# Patient Record
Sex: FEMALE | Race: BLACK OR AFRICAN AMERICAN | Employment: FULL TIME | URBAN - METROPOLITAN AREA
[De-identification: names, ages, dates, MRNs, and addresses within clinical notes are randomized per-mention and may not be internally consistent; named-entity substitution may affect disease eponyms.]

---

## 2018-10-23 ENCOUNTER — HOSPITAL ENCOUNTER (OUTPATIENT)
Dept: PHYSICAL THERAPY | Age: 43
Discharge: HOME | End: 2018-10-23
Payer: COMMERCIAL

## 2018-10-23 PROCEDURE — 97110 THERAPEUTIC EXERCISES: CPT

## 2018-10-23 PROCEDURE — 97530 THERAPEUTIC ACTIVITIES: CPT

## 2018-10-23 PROCEDURE — 97162 PT EVAL MOD COMPLEX 30 MIN: CPT

## 2018-10-23 PROCEDURE — 97112 NEUROMUSCULAR REEDUCATION: CPT

## 2018-10-23 NOTE — PROGRESS NOTES
[facility] 
[address] [place], [address] [facility] [address] [phone]  Fx [phone] Plan of Care/ Statement of Necessity for Physical Therapy Services Patient name: Gillian Michel Start of Care: 10/23/2018 Referral source: Edelmira Ly,* : 1975 Medical Diagnosis: Right shoulder pain [M25.511] Onset Date:beginning Oct 2018 Treatment Diagnosis: postural deficit associated with right>left upper quadrant pain Prior Hospitalization: see medical history Provider#: [id number] Medications: Verified on Patient summary List  
 Comorbidities: hs of CS/LS pain, Lupus Prior Level of Function: full function without shoulder but occasional neck pain The Plan of Care and following information is based on the information from the initial evaluation. Assessment/ key information: 37 YOF presenting to PT with reports of right >left shoulder/pectoral pain ranging from 3-10/10 and intermittent paresthesia in right arm. Objective findings include slumped habitual sitting posture, full shoulder /CS ROM except for right shoulder horizontal ABD, deficits in flexibility of shoulder girdle musculature ema anterior chest, TTP shoulder girdle musculature, (-) Empty can test, 5/5 MMT both shoulders with pain during resisted shoulder IR>ER. Patient does report bilateral intermittent popping of shoulders with backwards reaching. She also presents with shallow breathing pattern contributing to neck shoulder tightness. Symptoms are consistent with postural induced muscle dysfunction . She is a good candidate for skilled PT.   
Evaluation Complexity History MEDIUM  Complexity : 1-2 comorbidities / personal factors will impact the outcome/ POC ; Examination MEDIUM Complexity : 3 Standardized tests and measures addressing body structure, function, activity limitation and / or participation in recreation  ;Presentation MEDIUM Complexity : Evolving with changing characteristics ;Clinical Decision Making MEDIUM Complexity : FOTO score of 26-74 Overall Complexity Rating: MEDIUM Problem List: pain affecting function and decrease flexibility/ joint mobility Treatment Plan may include any combination of the following: Therapeutic exercise, Therapeutic activities, Neuromuscular re-education, Physical agent/modality, Manual therapy and Patient education Patient / Family readiness to learn indicated by: trying to perform skills and interest 
Persons(s) to be included in education: patient (P) Barriers to Learning/Limitations: None Patient Goal (s): alleviation of pain Patient Self Reported Health Status: good Rehabilitation Potential: good Short Term Goals: To be accomplished in 2 weeks: 1. Improved postural awareness and breathing pattern to allow self correction during ADL to facilitate optimal function/muscle status Status at [facility]: Patient education initiated 2. Patient reports reduction in pain with ADL to <or= 5/10 for increased ease with ADL Status at [facility]: pain 3-10/10 3. Reduction in right arm paresthesia by >or= 50% for increased ease with all ADL Status at [facility]: intermittent paresthesia right arm including digits 1-2 Long Term Goals: To be accomplished in 4 weeks: 1. Improved FOTO score to >or=72/100 as evidence of improved ability to perform overhead reaching and reaching across body Status at [facility]: FOTO 53/100 2. Reduction in pain to >or= 3/10 with prolonged sitting, computer work and ADL Status at [facility]: pain 3-10/10 3. Improved chest flexibility ema pectoralis muscle group to allow supine position with > 90 deg horizontal ABD for improved ease with ADL/reaching Status at [facility]: marked tightness right >left pectoralis/anterior chest 
 
4. Improved postural strength to allow good alignment during prolonged computer work for optimal muscle balance Status at [facility]: habitual fair kyphotic posture with [facility] 5. Patient independent with advanced HEP and symptom management to prepare for d/c to HEP Status at Eval: HEP initiated Frequency / Duration: Patient to be seen 2 times per week for 4 weeks. Patient/ Caregiver education and instruction: Diagnosis, prognosis, activity modification and exercises 
 [x]  Plan of care has been reviewed with PTA Walter Barlow, PT 10/23/2018 11:11 AM 
 
________________________________________________________________________ I certify that the above Therapy Services are being furnished while the patient is under my care. I agree with the treatment plan and certify that this therapy is necessary. [de-identified] Signature:____________Date:_________TIME:________ 
 
[employer], Date and Time must be completed for valid certification ** Please sign and return to [facility] 
[address], [address] [facility] [address] [phone]  Fx [phone]

## 2018-10-23 NOTE — PROGRESS NOTES
PT DAILY TREATMENT NOTE/SHOULDER EVAL 10-18 Patient Name: Kacie Denson Date:10/23/2018 : 1975 [x]  Patient  Verified Payor: Claude Older / Plan: [employer] RPN / Product Type: Commerical / In time:1110  Out time:1215 Total Treatment Time (min): 65 Visit #: 1 of 8 Medicare/BCBS Only Total Timed Codes (min): 45  1:1 Treatment Time:  55 Treatment Area: Right shoulder pain [M25.511] SUBJECTIVE Pain Level (0-10 scale): 6-7 right shoulder, 2-3/10 left shoulder 
[]constant [x]intermittent []improving []worsening []no change since onset Any medication changes, allergies to medications, adverse drug reactions, diagnosis change, or new procedure performed?: [x] No    [] Yes (see summary sheet for update) Subjective functional status/changes:reports waking up one morning about 2 weeks ago with difficulty moving the right shoulder. Pain started radiating into right neck shoulder region and also towards left shoulder. Feeling like need to support right arm to reduce pressure on shoulder. Nagging pain is constant. voltaren has been helping to ease the pain. Most pain upon waking in AM. Stiffness and achy pain in AM. 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 Reports also having Lupus with active flare-ups 2-3x each month including hand edema. No testing done so far. [facility] 10/2017- PT for CS and LS pain. PLOF: no shoulder pain but previous hx of right neck pain, full function Limitations to PLOF: full function with shoulder pain Mechanism of Injury: n/a Current symptoms/Complaints: mostly right shoulder pain, occasional paresthesia throughout right arm and hand/digits 1-2? Eloy Counter This mostly at work when arm is hanging. Previous Treatment/Compliance: PT for CS/LS in 2017 PMHx/Surgical Hx: n/a Work Hx: [employer] Living Situation: WNL Pt Goals: alleviate pain in right neck shoulder region Barriers: []pain []financial []time []transportation []other Motivation: good Substance use: []Alcohol []Tobacco []other:  
FABQ Score: []low []elevate Cognition: A & O x 3    Other: OBJECTIVE/EXAMINATION Domestic Life: WNL Activity/Recreational Limitations: n/a Mobility: WNL Self Care: WNL Modality rationale: Pain control Type Additional Details  
[] Estim:  []Unatt       []IFC  []Premod []Other:  []w/ice   []w/heat Position: Location:  
[] Estim: []Att    []TENS instruct  []NMES []Other:  []w/US   []w/ice   []w/heat Position: Location:  
[]  Traction: [] Cervical       []Lumbar 
                     [] Prone          []Supine []Intermittent   []Continuous Lbs: 
[] before manual 
[] after manual  
[]  Ultrasound: []Continuous   [] Pulsed []1MHz   []3MHz Location: 
W/cm2:  
[]  Iontophoresis with dexamethasone Location: [] Take home patch  
[] In clinic  
[]  Ice     [x]  Heat 10 min 
[]  Ice massage 
[]  Laser  
[]  Anodyne Position:supine 90/90 Location:CS and anterior chest  
[]  Laser with stim 
[]  Other: Position: Location:  
[]  Vasopneumatic Device Pressure:       [] lo [] med [] hi  
Temperature: [] lo [] med [] hi  
[] Skin assessment post-treatment:  []intact []redness- no adverse reaction 
  []redness  adverse reaction:  
 
15 min [x]Eval                  []Re-Eval  
 
 
10 min Therapeutic Exercise:  [x] See flow sheet :ROM both shoulders Rationale: increase ROM to improve the patients ability to return to PLOF without pain 15 min Therapeutic Activity:  [x]  See flow sheet :instruction in HEP, POC, breathing ex for improved rib cage mobility and diaphragmatic function, functional massage and MFR to right pec region Rationale: optimal trunk function and breathing pattern 15 min Neuromuscular Re-education:  [x]  See flow sheet :LISA for improved breathing, rib cage function and muscular balance Rationale: improved trunk function, postural alignment and muscle balance With 
 [] TE 
 [] TA 
 [] neuro 
 [] other: Patient Education: [x] Review HEP [] Progressed/Changed HEP based on:  
[] positioning   [] body mechanics   [] transfers   [] heat/ice application   
[] other:   
 
Other Objective/Functional Measures: as per evaluation Physical Therapy Evaluation - Shoulder Posture: [] Poor    [x] Fair    [] Good    Describe: 
 
ROM:  [] Unable to assess at this time AROM                                                              PROM Left Right  Left Right Flexion WNL WNL Flexion Extension WNL WNL Extension Scaption/ABD WNL WNL Scaptin/ABD    
ER @ 0 Degrees   ER @ 0 Degrees ER @ 90 Degrees   ER @ 90 Degrees IR @ 90 Degrees WNL WNL IR @ 90 Degrees HGIR right 30, , HGIR left 30,  Horizontal ADD WNL but onset of pain in pec region, both Popping in both shoulders with seated reach to LS End Feel / Painful Arc:(-) 
CS Rotation causing stretching/discomfort in pec reaching CS ROM WNL Strength:   [] Unable to assess at this time L (1-5) R (1-5) Pain Flexors 5 5 [] Yes   [x] No  
Abductors 5 5 [x] Yes   [] No  
External Rotators 5 5 [x] Yes   [] No  
Internal Rotators 5 5 [x] Yes   [] No  
Supraspinatus 5 5- [] Yes   [x] No  
Serratus Anterior 5 5 [] Yes   [] No  
Lower Trapezius 5 5 [] Yes   [] No  
Elbow Flexion 5 5 [] Yes   [] No  
Elbow Extension 5 5 [] Yes   [] No  
 
 
Scapulohumoral Control / Rhythm: 
Able to eccentrically lower with good control? Left: [x] Yes   [] No     Right: [x] Yes   [] No 
 
Accessory Motions: 
 
Palpation [] Min  [x] Mod  [] Severe    Location:overall right>left upper quadrant soreness ema UT, rhomboids, infraspinatus,levator, pec min/yuan, infraclavic right 
[] Min  [] Mod  [] Severe    Location: 
[] Min  [] Mod  [] Severe    Location: 
 
 
 
Optional Tests: (-) Sensation Left Right Reflexes Left Right Biceps (C5)   Biceps (C5) Clay Radial(C6-7)   Brachioradialis (C6) Clay Ulnar(C8-T1)   Triceps (C7) Adson's Test  [] Pos   [] Neg Yergason's Test [] Pos   [] Neg 
Marisel's Test  [] Pos   [] Neg Kenzie's Sign [] Pos   [] Neg Neer's Test  [] Pos   [] Neg Clunk Test  [] Pos   [] Neg 
Hawkin's Test  [] Pos   [] Neg AC Joint  [] Pos   [] Neg 
Speed's Test  [] Pos   [] Neg SC Joint  [] Pos   [] Neg 
Empty Can  [] Pos   [x] Neg Pectoral Tightness [x] Pos   [] Neg Anterior Apprehension [] Pos   [] Neg  
Posterior Apprehension [] Pos   [] Neg Other Tests / Comments:  
  
 
Pain Level (0-10 scale) post treatment: 0 
 
ASSESSMENT/Changes in Function: good tolerance to activities, less shoulder girdle guarding Patient will continue to benefit from skilled PT services to address ROM deficits and assess and modify postural abnormalities to attain remaining goals. [x]  See Plan of Care 
[]  See progress note/recertification 
[]  See Discharge Summary Progress towards goals / Updated goals: 
Reduction in pain, improved flexibility PLAN 
[]  Upgrade activities as tolerated     [x]  Continue plan of care 
[]  Update interventions per flow sheet      
[]  Discharge due to:_ 
[]  Other:_ Mina Bess PT 10/23/2018  11:11 AM

## 2018-11-01 ENCOUNTER — HOSPITAL ENCOUNTER (OUTPATIENT)
Dept: PHYSICAL THERAPY | Age: 43
Discharge: HOME | End: 2018-11-01
Payer: COMMERCIAL

## 2018-11-01 PROCEDURE — 97110 THERAPEUTIC EXERCISES: CPT

## 2018-11-01 PROCEDURE — 97140 MANUAL THERAPY 1/> REGIONS: CPT

## 2018-11-01 NOTE — PROGRESS NOTES
PT DAILY TREATMENT NOTE 12 Patient Name: Gillian Michel Date:2018 : 1975 [x]  Patient  Verified Payor: Tova Lin / Plan: Pran[employer] RPN / Product Type: Commerical / In time:1258  Out time:150 Total Treatment Time (min): 52 Visit #: 2 of 8 Treatment Area: No admission diagnoses are documented for this encounter. SUBJECTIVE Pain Level (0-10 scale): 3-4 right anterior shoulder Any medication changes, allergies to medications, adverse drug reactions, diagnosis change, or new procedure performed?: [x] No    [] Yes (see summary sheet for update) Subjective functional status/changes:   [] No changes reported Less pain in right chest, min pain right anterior shoulder OBJECTIVE Modality rationale: Pain control Type Additional Details  
[] Estim:  []Unatt       []IFC  []Premod []Other:  []w/ice   []w/heat Position: Location:  
[] Estim: []Att    []TENS instruct  []NMES []Other:  []w/US   []w/ice   []w/heat Position: Location:  
[]  Traction: [] Cervical       []Lumbar 
                     [] Prone          []Supine []Intermittent   []Continuous Lbs: 
[] before manual 
[] after manual  
[]  Ultrasound: []Continuous   [] Pulsed []1MHz   []3MHz W/cm2: 
Location:  
[]  Iontophoresis with dexamethasone Location: [] Take home patch  
[] In clinic  
[]  Ice     [x]  Heat 10 min 
[]  Ice massage 
[]  Laser  
[]  Anodyne Position:supine 90/90 Location:right neck shoulder  
[]  Laser with stim 
[]  Other:  Position: Location:  
[]  Vasopneumatic Device Pressure:       [] lo [] med [] hi  
Temperature: [] lo [] med [] hi  
[] Skin assessment post-treatment:  []intact []redness- no adverse reaction 
  []redness  adverse reaction:  
 
 
 
25 min Therapeutic Exercise:  [] See flow sheet :  
Rationale: increase ROM and increase strength to improve the patients ability to return to PLOF 12 min Neuromuscular Reeducation: LISA for improved trunk /shoulder alignment 15 min Manual Therapy:  Functional massage right shoulder, CFM to biceps tendon and supraspinatus insertion, facilitation of proper breathing and rib cage mobiliZation Rationale: decrease pain, increase ROM and increase tissue extensibility to improve function With 
 [] TE 
 [] TA 
 [] neuro 
 [] other: Patient Education: [x] Review HEP [] Progressed/Changed HEP based on:  
[] positioning   [] body mechanics   [] transfers   [] heat/ice application   
[] other:   
 
Other Objective/Functional Measures:  
TTP right pec minor, supraspinatus insertion, Right AC stiffness Challenged with QP activities Pain Level (0-10 scale) post treatment: 0 
 
ASSESSMENT/Changes in Function: no pain with active Flex post MT right shoulder Patient will continue to benefit from skilled PT services to address ROM deficits, address strength deficits and analyze and address soft tissue restrictions to attain remaining goals. [x]  See Plan of Care 
[]  See progress note/recertification 
[]  See Discharge Summary Progress towards goals / Updated goals: 
Short Term Goals: To be accomplished in 2 weeks: 1. Improved postural awareness and breathing pattern to allow self correction during ADL to facilitate optimal function/muscle status Status at Eval: Patient education initiated Current status: compliant with HEP 
  
2. Patient reports reduction in pain with ADL to <or= 5/10 for increased ease with ADL Status at Eval: pain 3-10/10 
  
3. Reduction in right arm paresthesia by >or= 50% for increased ease with all ADL Status at Eval: intermittent paresthesia right arm including digits 1-2 
  
Long Term Goals: To be accomplished in 4 weeks: 1. Improved FOTO score to >or=72/100 as evidence of improved ability to perform overhead reaching and reaching across body Status at Eval: FOTO 53/100   
2. Reduction in pain to >or= 3/10 with prolonged sitting, computer work and ADL Status at Eval: pain 3-10/10 
  
3. Improved chest flexibility ema pectoralis muscle group to allow supine position with > 90 deg horizontal ABD for improved ease with ADL/reaching Status at Eval: marked tightness right >left pectoralis/anterior chest 
  
4. Improved postural strength to allow good alignment during prolonged computer work for optimal muscle balance Status at Eval: habitual fair kyphotic posture with FHP 
  
5. Patient independent with advanced HEP and symptom management to prepare for d/c to HEP Status at Eval: HEP initiated 
  
 
 
 
PLAN 
[]  Upgrade activities as tolerated     []  Continue plan of care 
[]  Update interventions per flow sheet      
[]  Discharge due to:_ 
[]  Other:_ Gema Bone PT 11/1/2018  1:43 PM 
 
Future Appointments Date Time Provider Larry Barry 11/2/2018 11:00 AM Dena Hathaway PT [facility]  
11/8/2018  2:00 PM Dena Hathaway PT [facility]  
11/9/2018  1:00 PM THE [facility] PT RES [facility]  
11/15/2018  1:00 PM Nikolai Westfall PT [facility]  
11/16/2018  1:00 PM THE [facility] PT RES [facility]

## 2018-11-02 ENCOUNTER — APPOINTMENT (OUTPATIENT)
Dept: PHYSICAL THERAPY | Age: 43
End: 2018-11-02
Payer: COMMERCIAL

## 2018-11-08 ENCOUNTER — HOSPITAL ENCOUNTER (OUTPATIENT)
Dept: PHYSICAL THERAPY | Age: 43
Discharge: HOME | End: 2018-11-08
Payer: COMMERCIAL

## 2018-11-08 PROCEDURE — 97140 MANUAL THERAPY 1/> REGIONS: CPT

## 2018-11-08 PROCEDURE — 97110 THERAPEUTIC EXERCISES: CPT

## 2018-11-08 PROCEDURE — 97112 NEUROMUSCULAR REEDUCATION: CPT

## 2018-11-08 NOTE — PROGRESS NOTES
PT DAILY TREATMENT NOTE  Patient Name: Kacie Denson Date:2018 : 1975 [x]  Patient  Verified Payor: Claude Older / Plan: Malachi Odell RPN / Product Type: Commerical / In time:205  Out time:250 Total Treatment Time (min): 45 Visit #: 3 of 8 Treatment Area: Pain in right shoulder [M25.511] SUBJECTIVE Pain Level (0-10 scale): 0 right anterior shoulder Any medication changes, allergies to medications, adverse drug reactions, diagnosis change, or new procedure performed?: [x] No    [] Yes (see summary sheet for update) Subjective functional status/changes:   [] No changes reported Improved pain, no more right arm paresthesia, no nocturnal pain, residual right chest tigntness OBJECTIVE Modality rationale: Pain control Type Additional Details  
[] Estim:  []Unatt       []IFC  []Premod []Other:  []w/ice   []w/heat Position: Location:  
[] Estim: []Att    []TENS instruct  []NMES []Other:  []w/US   []w/ice   []w/heat Position: Location:  
[]  Traction: [] Cervical       []Lumbar 
                     [] Prone          []Supine []Intermittent   []Continuous Lbs: 
[] before manual 
[] after manual  
[]  Ultrasound: []Continuous   [] Pulsed []1MHz   []3MHz W/cm2: 
Location:  
[]  Iontophoresis with dexamethasone Location: [] Take home patch  
[] In clinic  
[]  Ice     [x]  Heat 10 min 
[]  Ice massage 
[]  Laser  
[]  Anodyne Position:supine 90/90 Location:right neck shoulder  
[]  Laser with stim 
[]  Other:  Position: Location:  
[]  Vasopneumatic Device Pressure:       [] lo [] med [] hi  
Temperature: [] lo [] med [] hi  
[] Skin assessment post-treatment:  []intact []redness- no adverse reaction 
  []redness  adverse reaction:  
 
 
 
15 min Therapeutic Exercise:  [] See flow sheet :  
Rationale: increase ROM and increase strength to improve the patients ability to return to [facility] 10 min Neuromuscular Reeducation: core activation and shoulder stability 10 min Manual Therapy:  Functional massage right shoulder, CFM to biceps tendon and supraspinatus insertion, Rationale: decrease pain, increase ROM and increase tissue extensibility to improve function With 
 [] TE 
 [] TA 
 [] neuro 
 [] other: Patient Education: [x] Review HEP [] Progressed/Changed HEP based on:  
[] positioning   [] body mechanics   [] transfers   [] heat/ice application   
[] other:   
 
Other Objective/Functional Measures:  
TTP right pec minor, supraspinatus insertion, Right AC stiffness Challenged with QP activities Pain Level (0-10 scale) post treatment: 0 
 
ASSESSMENT/Changes in Function: no pain with active Flex post MT right shoulder Patient will continue to benefit from skilled PT services to address ROM deficits, address strength deficits and analyze and address soft tissue restrictions to attain remaining goals. [x]  See Plan of Care 
[]  See progress note/recertification 
[]  See Discharge Summary Progress towards goals / Updated goals: 
Short Term Goals: To be accomplished in 2 weeks: 1. Improved postural awareness and breathing pattern to allow self correction during ADL to facilitate optimal function/muscle status Status at Eval: Patient education initiated Current status: compliant with HEP 
  
2. Patient reports reduction in pain with ADL to <or= 5/10 for increased ease with ADL Status at Eval: pain 3-10/10 
  
3. Reduction in right arm paresthesia by >or= 50% for increased ease with all ADL Status at Eval: intermittent paresthesia right arm including digits 1-2 
  
Long Term Goals: To be accomplished in 4 weeks: 1. Improved FOTO score to >or=72/100 as evidence of improved ability to perform overhead reaching and reaching across body Status at Eval: FOTO 53/100 
  
 2. Reduction in pain to >or= 3/10 with prolonged sitting, computer work and ADL Status at Eval: pain 3-10/10 
  
3. Improved chest flexibility ema pectoralis muscle group to allow supine position with > 90 deg horizontal ABD for improved ease with ADL/reaching Status at Eval: marked tightness right >left pectoralis/anterior chest 
  
4. Improved postural strength to allow good alignment during prolonged computer work for optimal muscle balance Status at Eval: habitual fair kyphotic posture with FHP 
  
5. Patient independent with advanced HEP and symptom management to prepare for d/c to HEP Status at Eval: HEP initiated 
  
 
 
 
PLAN 
[]  Upgrade activities as tolerated     []  Continue plan of care 
[]  Update interventions per flow sheet      
[]  Discharge due to:_ 
[]  Other:_ Viridiana Wade, PT 11/8/2018  1:43 PM 
 
Future Appointments Date Time Provider Larry Barry 11/9/2018  1:00 PM THE [facility] PT RES [facility]  
11/15/2018  1:00 PM Fabienne Mendoza, PT [facility]  
11/16/2018  1:00 PM THE [facility] PT RES [facility]

## 2018-11-09 ENCOUNTER — HOSPITAL ENCOUNTER (OUTPATIENT)
Dept: PHYSICAL THERAPY | Age: 43
Discharge: HOME | End: 2018-11-09
Payer: COMMERCIAL

## 2018-11-09 PROCEDURE — 97140 MANUAL THERAPY 1/> REGIONS: CPT

## 2018-11-09 PROCEDURE — 97110 THERAPEUTIC EXERCISES: CPT

## 2018-11-09 NOTE — PROGRESS NOTES
PT DAILY TREATMENT NOTE  Patient Name: Dana Mcgee Date:2018 : 1975 [x]  Patient  Verified Payor: Ellis Kim / Plan: Marjan Rebolledo RPN / Product Type: Commerical / In time:1300  Out time:1330 Total Treatment Time (min): 30 min Visit #: 4 of 8 Treatment Area: Pain in right shoulder [M25.511] SUBJECTIVE Pain Level (0-10 scale): 0 right anterior shoulder Any medication changes, allergies to medications, adverse drug reactions, diagnosis change, or new procedure performed?: [x] No    [] Yes (see summary sheet for update) Subjective functional status/changes:   [] No changes reported C/o increased pain today; was just in PT last night and feels increased soreness. Agreeable to participate. OBJECTIVE 20 min Therapeutic Exercise:  [x] See flow sheet :  
Rationale: increase ROM and increase strength to improve the patients ability to return to PLOF 10 min Manual Therapy:  Passive posterior capsule stretch and pectoralis stretch; posterior and inferior glenohumeral mob, grade II. Rationale: decrease pain, increase ROM and increase tissue extensibility to improve function With 
 [x] TE 
 [] TA 
 [] neuro 
 [] other: Patient Education: [x] Review HEP [] Progressed/Changed HEP based on:  
[] positioning   [] body mechanics   [] transfers   [x] heat/ice application   
[] other:   
 
 
 
Pain Level (0-10 scale) post treatment: 5 
 
ASSESSMENT/Changes in Function: Modified program today to avoid further exacerbation. Tolerated modification well. Patient will continue to benefit from skilled PT services to address ROM deficits, address strength deficits and analyze and address soft tissue restrictions to attain remaining goals. [x]  See Plan of Care 
[]  See progress note/recertification 
[]  See Discharge Summary Progress towards goals / Updated goals: 
Short Term Goals: To be accomplished in 2 weeks: 1. Improved postural awareness and breathing pattern to allow self correction during ADL to facilitate optimal function/muscle status Status at Eval: Patient education initiated Current status: compliant with HEP 
  
2. Patient reports reduction in pain with ADL to <or= 5/10 for increased ease with ADL Status at Eval: pain 3-10/10 
  
3. Reduction in right arm paresthesia by >or= 50% for increased ease with all ADL Status at Eval: intermittent paresthesia right arm including digits 1-2 
  
Long Term Goals: To be accomplished in 4 weeks: 1. Improved FOTO score to >or=72/100 as evidence of improved ability to perform overhead reaching and reaching across body Status at Eval: FOTO 53/100 
  
2. Reduction in pain to >or= 3/10 with prolonged sitting, computer work and ADL Status at Eval: pain 3-10/10 
  
3. Improved chest flexibility ema pectoralis muscle group to allow supine position with > 90 deg horizontal ABD for improved ease with ADL/reaching Status at Eval: marked tightness right >left pectoralis/anterior chest 
  
4. Improved postural strength to allow good alignment during prolonged computer work for optimal muscle balance Status at Eval: habitual fair kyphotic posture with FHP 
  
5. Patient independent with advanced HEP and symptom management to prepare for d/c to HEP Status at Eval: HEP initiated 
  
 
 
 
PLAN 
[]  Upgrade activities as tolerated     [x]  Continue plan of care 
[]  Update interventions per flow sheet      
[]  Discharge due to:_ 
[]  Other:_   
 
Wagner , PT, DPT, OCS  11/9/2018  1:43 PM 
 
Future Appointments Date Time Provider Larry Barry 11/15/2018  1:00 PM Miesha Lord PT [facility]  
11/16/2018  1:00 PM [facility] PT RES CTR [facility]

## 2018-11-15 ENCOUNTER — HOSPITAL ENCOUNTER (OUTPATIENT)
Dept: PHYSICAL THERAPY | Age: 43
Discharge: HOME | End: 2018-11-15
Payer: COMMERCIAL

## 2018-11-15 PROCEDURE — 97140 MANUAL THERAPY 1/> REGIONS: CPT | Performed by: PHYSICAL THERAPIST

## 2018-11-15 PROCEDURE — 97110 THERAPEUTIC EXERCISES: CPT | Performed by: PHYSICAL THERAPIST

## 2018-11-15 NOTE — PROGRESS NOTES
PT DAILY TREATMENT NOTE  Patient Name: Janine Strange Date:11/15/2018 : 1975 [x]  Patient  Verified Payor: Laly Diaz / Plan: Gerri Briceño RPN / Product Type: Commerical / In time:1310  Out time:1405 Total Treatment Time (min): 55 Visit #: 5 of 8 Treatment Area: Pain in right shoulder [M25.511] SUBJECTIVE Pain Level (0-10 scale): 0 Any medication changes, allergies to medications, adverse drug reactions, diagnosis change, or new procedure performed?: [x] No    [] Yes (see summary sheet for update) Subjective functional status/changes:   [x] No changes reported OBJECTIVE 
  
  
43 min Therapeutic Exercise:  [x] See flow sheet :  
Rationale: increase ROM and increase strength to improve the patients ability to return to PLOF 
  
  
12 min Manual Therapy:  manipulation to thoracic , mm release for levator scap, and pec minor Rationale: decrease pain, increase ROM and increase tissue extensibility to improve function With 
 [] TE 
 [] TA 
 [] neuro 
 [] other: Patient Education: [x] Review HEP [] Progressed/Changed HEP based on:  
[] positioning   [] body mechanics   [] transfers   [] heat/ice application   
[] other:   
 
Other Objective/Functional Measures: left rotation alignment T1 - T4 , noted in sitting with stiffness at neck end range rotation ( motion itself is WNL) ,  
Manual tech manipulation to 3 levels thoracic C7/T1 junction , T4 and T7 levels + cavitation higher 2 , following pt states feels looser no pain with rotation cervical and UE abd and Flexion motions looser . Manual mm release : levator scap during active diagonal stretch  and pec minor Repeated motions for shoulder motion Taught cervical retract extend in sitting Added star against tband UE pressure againt wall Pain Level (0-10 scale) post treatment: 1 fatigue ASSESSMENT/Changes in Function: tolerated well , responded well to manual for alignment and  mm stretch, no pain during ex just fatigue Patient will continue to benefit from skilled PT services to modify and progress therapeutic interventions, address functional mobility deficits, address ROM deficits, address strength deficits, analyze and address soft tissue restrictions, analyze and cue movement patterns, analyze and modify body mechanics/ergonomics and assess and modify postural abnormalities to attain remaining goals. [x]  See Plan of Care 
[]  See progress note/recertification 
[]  See Discharge Summary Progress towards goals / Updated goals: 
Short Term Goals: To be accomplished in 2 weeks: 
             1. Improved postural awareness and breathing pattern to allow self correction during ADL to facilitate optimal function/muscle status Status at [facility]: Patient education initiated Current status: compliant with HEP 
  
2. Patient reports reduction in pain with ADL to <or= 5/10 for increased ease with ADL Status at [facility]: pain 3-10/10 Current : recent 0-6/10  
  
3. Reduction in right arm paresthesia by >or= 50% for increased ease with all ADL Status at [facility]: intermittent paresthesia right arm including digits 1-2 Current : no paraesthesia this session  
  
Long Term Goals: To be accomplished in 4 weeks: 
             1. Improved FOTO score to >or=72/100 as evidence of improved ability to perform overhead reaching and reaching across body Status at [facility]: FOTO 53/100 Current : 55/100   
 
2. Reduction in pain to >or= 3/10 with prolonged sitting, computer work and ADL Status at [facility]: pain 3-10/10 Current : 0-6 
  
3. Improved chest flexibility ema pectoralis muscle group to allow supine position with > 90 deg horizontal ABD for improved ease with ADL/reaching Status at [facility]: marked tightness right >left pectoralis/anterior chest 
  
  
4. Improved postural strength to allow good alignment during prolonged computer work for optimal muscle balance Status at Eval: habitual fair kyphotic posture with FHP 
  
5. Patient independent with advanced HEP and symptom management to prepare for d/c to HEP Status at Eval: HEP initiated PLAN [x]  Upgrade activities as tolerated     [x]  Continue plan of care 
[]  Update interventions per flow sheet      
[]  Discharge due to:_ 
[]  Other:_ Newtia Singh PT 11/15/2018  1:31 PM 
 
Future Appointments Date Time Provider Larry Barry 11/16/2018  8:00 AM [facility] PT RES CTR [facility]

## 2018-11-16 ENCOUNTER — HOSPITAL ENCOUNTER (OUTPATIENT)
Dept: PHYSICAL THERAPY | Age: 43
Discharge: HOME | End: 2018-11-16
Payer: COMMERCIAL

## 2018-11-16 PROCEDURE — 97110 THERAPEUTIC EXERCISES: CPT

## 2018-11-16 PROCEDURE — 97140 MANUAL THERAPY 1/> REGIONS: CPT

## 2018-11-16 NOTE — PROGRESS NOTES
PT DAILY TREATMENT NOTE  Patient Name: Sherry Cedeno Date:2018 : 1975 [x]  Patient  Verified Payor: Fannie Rivera / Plan: Herrera GARCIA / Product Type: Commerical / In time:0800  Out time: 7132 Total Treatment Time (min): 49 Visit #: 6 of 8 Treatment Area: Pain in right shoulder [M25.511] SUBJECTIVE Pain Level (0-10 scale): 0 Any medication changes, allergies to medications, adverse drug reactions, diagnosis change, or new procedure performed?: [x] No    [] Yes (see summary sheet for update) Subjective functional status/changes:   [x] No changes reported Patient reports 0/10 pain. Overall, feels about 50% improvement. Primarily c/o stiffness at anterior capsule. OBJECTIVE 
  
   
Modality rationale: Pain Control Type Additional Details Heat x 10 min  Position:  Seated Location: Right Shoulder 29 min Therapeutic Exercise:  [x] See flow sheet :  
Rationale: increase ROM and increase strength to improve the patients ability to return to PLOF 
  
  
10 min Manual Therapy:  Ant and posterior capsular stretch, right glenohumeral joint. Myofascial release pectoralis; subscapularis/upper trapezius passive stretch. First rib inferior mob. Rationale: decrease pain, increase ROM and increase tissue extensibility to improve function With 
 [x] TE 
 [] TA 
 [] neuro 
 [] other: Patient Education: [x] Review HEP [] Progressed/Changed HEP based on:  
[] positioning   [] body mechanics   [] transfers   [] heat/ice application   
[] other:   
 
Other Objective/Functional Measures: 
[place]: Symmetrical 
Neer (-) Painful arc (-) Speeds (-) Apprehension (-) Pain Level (0-10 scale) post treatment: 2  
 
ASSESSMENT/Changes in Function:    
Patient progressing well with therapy. Primarily c/o fatigue and requires some verbal/tactile cues for postural awareness and technique. But overall progressing well. Patient will continue to benefit from skilled PT services to modify and progress therapeutic interventions, address functional mobility deficits, address ROM deficits, address strength deficits, analyze and address soft tissue restrictions, analyze and cue movement patterns, analyze and modify body mechanics/ergonomics and assess and modify postural abnormalities to attain remaining goals. [x]  See Plan of Care 
[]  See progress note/recertification 
[]  See Discharge Summary Progress towards goals / Updated goals: 
Short Term Goals: To be accomplished in 2 weeks: 1. Improved postural awareness and breathing pattern to allow self correction during ADL to facilitate optimal function/muscle status Status at [facility]: Patient education initiated Current status: compliant with HEP 
  
2. Patient reports reduction in pain with ADL to <or= 5/10 for increased ease with ADL Status at [facility]: pain 3-10/10 Current : recent 0-6/10  
  
3. Reduction in right arm paresthesia by >or= 50% for increased ease with all ADL Status at [facility]: intermittent paresthesia right arm including digits 1-2 Current : no paraesthesia this session  
  
Long Term Goals: To be accomplished in 4 weeks: 
 1. Improved FOTO score to >or=72/100 as evidence of improved ability to perform overhead reaching and reaching across body Status at [facility]: FOTO 53/100 Current : 55/100   
 
2. Reduction in pain to >or= 3/10 with prolonged sitting, computer work and ADL Status at [facility]: pain 3-10/10 Current : 0-6 
  
3. Improved chest flexibility ema pectoralis muscle group to allow supine position with > 90 deg horizontal ABD for improved ease with ADL/reaching Status at [facility]: marked tightness right >left pectoralis/anterior chest 
Current: Improving 
  
  
4. Improved postural strength to allow good alignment during prolonged computer work for optimal muscle balance Status at [facility]: habitual fair kyphotic posture with [facility] 
  
 5. Patient independent with advanced HEP and symptom management to prepare for d/c to HEP Status at Eval: HEP initiated PLAN [x]  Upgrade activities as tolerated     [x]  Continue plan of care 
[]  Update interventions per flow sheet      
[]  Discharge due to:_ 
[]  Other:_   
 
Talha Grossman DPT, OCS 11/16/2018  1:31 PM

## 2018-12-06 ENCOUNTER — HOSPITAL ENCOUNTER (OUTPATIENT)
Dept: PHYSICAL THERAPY | Age: 43
End: 2018-12-06
Payer: COMMERCIAL

## 2018-12-13 ENCOUNTER — HOSPITAL ENCOUNTER (OUTPATIENT)
Dept: PHYSICAL THERAPY | Age: 43
Discharge: HOME | End: 2018-12-13
Payer: COMMERCIAL

## 2018-12-13 PROCEDURE — 97110 THERAPEUTIC EXERCISES: CPT

## 2018-12-13 PROCEDURE — 97530 THERAPEUTIC ACTIVITIES: CPT

## 2018-12-13 NOTE — PROGRESS NOTES
[facility] at [facility]  [address], [address] [facility] [place]  Ph [phone]  Fx [phone]    Physical Therapy Progress Note  Patient name: Gerson Rai Start of Care: 2018   Referral source: Carmen Murguia,* : 1975   Medical/Treatment Diagnosis: Pain in right shoulder [M25.511] Onset Date:2018     Prior Hospitalization: see medical history Provider#: [id number]   Medications: Verified on Patient Summary List    Comorbidities: CS/LS spine; lupus  Prior Level of Function:Full function without shoulder but ocassional neck pain    Visits from Start of Care: 7    Missed Visits: 0    Established Goals:         Excellent           Good         Limited           None  [] Increased ROM   []  [x]  []  []  [] Increased Strength   []  [x]  []  []  [] Increased Mobility   []  [x]  []  []   [] Decreased Pain   [x]  []  []  []    Key Functional Changes: FOTO score 87    Progress towards goals / Updated goals:  Short Term Goals: To be accomplished in 2 weeks:  1. Improved postural awareness and breathing pattern to allow self correction during ADL to facilitate optimal function/muscle status  Status at Eval: Patient education initiated  Current status: compliant with HEP     2. Patient reports reduction in pain with ADL to <or= 5/10 for increased ease with ADL  Status at Eval: pain 3-10/10  Current : recent 0/10 - MET      3. Reduction in right arm paresthesia by >or= 50% for increased ease with all ADL  Status at Eval: intermittent paresthesia right arm including digits 1-2  Current : C/o stiffness - MET     Long Term Goals: To be accomplished in 4 weeks:   1. Improved FOTO score to >or=72/100 as evidence of improved ability to perform overhead reaching and reaching across body  Status at Eval: FOTO 53/100  Current : 87/100 - MET      2. Reduction in pain to >or= 3/10 with prolonged sitting, computer work and ADL  Status at Eval: pain 3-10/10  Current : 0/10 - MET     3. Improved chest flexibility ema pectoralis muscle group to allow supine position with > 90 deg horizontal ABD for improved ease with ADL/reaching  Status at [facility]: marked tightness right >left pectoralis/anterior chest  Current: MET      4. Improved postural strength to allow good alignment during prolonged computer work for optimal muscle balance  Status at [facility]: habitual fair kyphotic posture with FHP  Current:  Needs reiteration - HEP advancement     5. Patient independent with advanced HEP and symptom management to prepare for d/c to HEP  Status at [facility]: HEP initiated  Current:  Progressing      ASSESSMENT/RECOMMENDATIONS:  [x]Continue therapy per initial plan/protocol for one more visit - progress advance HEP; goals mostly met. Good progress.     []Continue therapy with the following recommended changes:_____________________      _____________________________________________________________________  []Discontinue therapy progressing towards or have reached established goals  []Discontinue therapy due to lack of appreciable progress towards goals  []Discontinue therapy due to lack of attendance or compliance  []Await Physician's recommendations/decisions regarding therapy  []Other:________________________________________________________________    Thank you for this referral.   Emily Arreola DPT, OCS 12/13/2018 9:13 AM    NOTE TO PHYSICIAN:  [address] AND   FAX TO [facility] [place] Physical [facility]: [phone]  If you are unable to process this request in 24 hours please contact our office: [phone]      ____ I have read the above report and request that my patient continue therapy with the following changes/special instructions:  ____ I have read the above report and request that my patient be discharged from therapy    Physician's Signature:____________Date:_________TIME:________    ** Signature, Date and Time must be completed for valid certification **

## 2018-12-13 NOTE — PROGRESS NOTES
PT DAILY TREATMENT NOTE     Patient Name: Nupur Srinivasan  Date:2018  : 1975  [x]  Patient  Verified  Payor: Seema Busch / Plan: Alexia Alvarado RPN / Product Type: Commerical /    In SIUP:1059  Out time:900   Total Treatment Time (min): 28  Visit #: 7 of 8    Treatment Area: Pain in right shoulder [M25.511]    SUBJECTIVE  Pain Level (0-10 scale): 0  Any medication changes, allergies to medications, adverse drug reactions, diagnosis change, or new procedure performed?: [x] No    [] Yes (see summary sheet for update)  Subjective functional status/changes:   [x] No changes reported  Patient reports 0/10 pain. Overall, reports much improvement. Pleased with progress. Would like to advance program.  Has not been here in several weeks. OBJECTIVE        20 min Therapeutic Exercise:  [x] See flow sheet :  HEP advanced - with introduction to rows (green theraband provided), nerve tension floss, first rib mob, and scalene stretch. Rationale: increase ROM and increase strength to improve the patients ability to return to PLOF        8 min Therapeutic Activity:  Detailed discussion regarding postural awareness and exercises, proper ergonomics and joint protection strategies. Rationale: decrease pain, increase ROM and increase tissue extensibility to improve function            With   [x] TE   [] TA   [] neuro   [] other: Patient Education: [x] Review HEP    [] Progressed/Changed HEP based on:   [] positioning   [] body mechanics   [] transfers   [] heat/ice application    [] other:      Other Objective/Functional Measures:  FOTO score:  87 on 18    Pain Level (0-10 scale) post treatment: 0     ASSESSMENT/Changes in Function:     Patient progressing well with therapy. Primarily c/o fatigue and requires some verbal/tactile cues for postural awareness and technique. But overall progressing well.      Patient will continue to benefit from skilled PT services to modify and progress therapeutic interventions, address functional mobility deficits, address ROM deficits, address strength deficits, analyze and address soft tissue restrictions, analyze and cue movement patterns, analyze and modify body mechanics/ergonomics and assess and modify postural abnormalities to attain remaining goals. []  See Plan of Care  [x]  See progress note/recertification  []  See Discharge Summary         Progress towards goals / Updated goals:  Short Term Goals: To be accomplished in 2 weeks:  1. Improved postural awareness and breathing pattern to allow self correction during ADL to facilitate optimal function/muscle status  Status at [facility]: Patient education initiated  Current status: compliant with HEP     2. Patient reports reduction in pain with ADL to <or= 5/10 for increased ease with ADL  Status at [facility]: pain 3-10/10  Current : recent 0/10 - MET      3. Reduction in right arm paresthesia by >or= 50% for increased ease with all ADL  Status at [facility]: intermittent paresthesia right arm including digits 1-2  Current : C/o stiffness - MET     Long Term Goals: To be accomplished in 4 weeks:   1. Improved FOTO score to >or=72/100 as evidence of improved ability to perform overhead reaching and reaching across body  Status at [facility]: FOTO 53/100  Current : 87/100 - MET     2. Reduction in pain to >or= 3/10 with prolonged sitting, computer work and ADL  Status at [facility]: pain 3-10/10  Current : 0/10 - MET     3. Improved chest flexibility ema pectoralis muscle group to allow supine position with > 90 deg horizontal ABD for improved ease with ADL/reaching  Status at [facility]: marked tightness right >left pectoralis/anterior chest  Current: MET      4. Improved postural strength to allow good alignment during prolonged computer work for optimal muscle balance  Status at [facility]: habitual fair kyphotic posture with FHP  Current:  Needs reiteration - HEP advancement     5.  Patient independent with advanced HEP and symptom management to prepare for d/c to HEP  Status at Eval: HEP initiated  Current:  Progressing       PLAN  [x]  Upgrade activities as tolerated     [x]  Continue plan of care for one more visit - HEP progression, then d/c  []  Update interventions per flow sheet       []  Discharge due to:_  []  Other:_      Hannah Posada DPT, OCS 12/13/2018  1:31 PM

## 2018-12-14 ENCOUNTER — HOSPITAL ENCOUNTER (OUTPATIENT)
Dept: PHYSICAL THERAPY | Age: 43
Discharge: HOME | End: 2018-12-14
Payer: COMMERCIAL

## 2018-12-14 PROCEDURE — 97530 THERAPEUTIC ACTIVITIES: CPT

## 2018-12-14 PROCEDURE — 97110 THERAPEUTIC EXERCISES: CPT

## 2018-12-14 NOTE — PROGRESS NOTES
[facility] at [facility] [address]  [facility], [address]  Ph [phone]  Fx [phone]    Physical Therapy Discharge Summary    Patient name: Karina Oliva Start of Care: 2018   Referral source: Wendy Connor,* : 1975   Medical/Treatment Diagnosis: Pain in right shoulder [M25.511] Onset Date:2018      Prior Hospitalization: see medical history Provider#: [id number]   Medications: Verified on Patient Summary List     Comorbidities: CS/LS spine; lupus  Prior Level of Function:Full function without shoulder but ocassional neck pain             Visits from Start of Care: 8    Missed Visits: 0    Reporting Period : 10/23/18  to 18    Summary of Care:    Progress towards goals / Updated goals:  Short Term Goals: To be accomplished in 2 weeks:  1. Improved postural awareness and breathing pattern to allow self correction during ADL to facilitate optimal function/muscle status  Status at Eval: Patient education initiated  Current status: compliant with HEP  Status at d/c: independent with final HEP, goal met     2.  Patient reports reduction in pain with ADL to <or= 5/10 for increased ease with ADL  Status at Eval: pain 3-10/10  Current : recent 0/10 - MET      3. Reduction in right arm paresthesia by >or= 50% for increased ease with all ADL  Status at Eval: intermittent paresthesia right arm including digits 1-2  Current : C/o stiffness - MET     Long Term Goals: To be accomplished in 4 weeks:   1. Improved FOTO score to >or=72/100 as evidence of improved ability to perform overhead reaching and reaching across body  Status at Eval: FOTO 53/100  Current : 87/100 - MET     2. Reduction in pain to >or= 3/10 with prolonged sitting, computer work and ADL  Status at Eval: pain 3-10/10  Current : 0/10 - MET     3. Improved chest flexibility ema pectoralis muscle group to allow supine position with > 90 deg horizontal ABD for improved ease with ADL/reaching  Status at Eval: marked tightness right >left pectoralis/anterior chest  Current: MET      4. Improved postural strength to allow good alignment during prolonged computer work for optimal muscle balance  Status at Eval: habitual fair kyphotic posture with FHP  Current:  Needs reiteration - HEP advancement  Status at d/c: good understanding of postural alignment, need for positional changes and ability to self correct, goal met     5. Patient independent with advanced HEP and symptom management to prepare for d/c to HEP  Status at Eval: HEP initiated  Current:  Progressing   Status at d/c: independent with HEP, MET    ASSESSMENT/RECOMMENDATIONS:patient has shown excellent progress and is pain free with ADL. She has met all goals.  Recommend d/c.  [x]Discontinue therapy: [x]Patient has reached or is progressing toward set goals      []Patient is non-compliant or has abdicated      []Due to lack of appreciable progress towards set goals    Anjum Soliz, PT 12/14/2018 8:46 AM

## 2018-12-14 NOTE — PROGRESS NOTES
PT DAILY TREATMENT NOTE     Patient Name: Jasen Zamorano  Date:2018  : 1975  [x]  Patient  Verified  Payor: Nurys Camejo / Plan: Colton GARCIA / Product Type: Commerical /    In time:0830  Out time:900   Total Treatment Time (min): 30  Visit #: 8 of 8    Treatment Area: Pain in right shoulder [M25.511]    SUBJECTIVE  Pain Level (0-10 scale): 0  Any medication changes, allergies to medications, adverse drug reactions, diagnosis change, or new procedure performed?: [x] No    [] Yes (see summary sheet for update)  Subjective functional status/changes:   [x] No changes reported  Reports good improvement. No pain with ADL. Occasional fatigue in arm with computer work. OBJECTIVE        15 min Therapeutic Exercise:  [x] See flow sheet :  HEP advanced - with introduction to rows (green theraband provided), nerve tension floss, first rib mob, and scalene stretch. Rationale: increase ROM and increase strength to improve the patients ability to return to PLOF        15 min Therapeutic Activity:  reevaluation, review of final HEP   Rationale: decrease pain, increase ROM and increase tissue extensibility to improve function            With   [x] TE   [] TA   [] neuro   [] other: Patient Education: [x] Review HEP    [] Progressed/Changed HEP based on:   [] positioning   [] body mechanics   [] transfers   [] heat/ice application    [] other:      Other Objective/Functional Measures:  FOTO score:  87 on 18  Added to HEP:  Right shoulder depression for UT inhibition  Reviewed proper breathing and use of balloon  Scalene stretch  Nerve flossing  Self massage and mobilization      Pain Level (0-10 scale) post treatment: 0     ASSESSMENT/Changes in Function:     Patient has met all goals.  Good understanding of HEP    Patient will continue to benefit from skilled PT services to modify and progress therapeutic interventions, address functional mobility deficits, address ROM deficits, address strength deficits, analyze and address soft tissue restrictions, analyze and cue movement patterns, analyze and modify body mechanics/ergonomics and assess and modify postural abnormalities to attain remaining goals. [x]  See Discharge Summary         Progress towards goals / Updated goals:  Short Term Goals: To be accomplished in 2 weeks:  1. Improved postural awareness and breathing pattern to allow self correction during ADL to facilitate optimal function/muscle status  Status at [facility]: Patient education initiated  Current status: compliant with HEP  Status at d/: independent with final HEP, goal met     2. Patient reports reduction in pain with ADL to <or= 5/10 for increased ease with ADL  Status at [facility]: pain 3-10/10  Current : recent 0/10 - MET      3. Reduction in right arm paresthesia by >or= 50% for increased ease with all ADL  Status at [facility]: intermittent paresthesia right arm including digits 1-2  Current : C/o stiffness - MET     Long Term Goals: To be accomplished in 4 weeks:   1. Improved FOTO score to >or=72/100 as evidence of improved ability to perform overhead reaching and reaching across body  Status at [facility]: FOTO 53/100  Current : 87/100 - MET     2. Reduction in pain to >or= 3/10 with prolonged sitting, computer work and ADL  Status at [facility]: pain 3-10/10  Current : 0/10 - MET     3. Improved chest flexibility ema pectoralis muscle group to allow supine position with > 90 deg horizontal ABD for improved ease with ADL/reaching  Status at [facility]: marked tightness right >left pectoralis/anterior chest  Current: MET      4. Improved postural strength to allow good alignment during prolonged computer work for optimal muscle balance  Status at [facility]: habitual fair kyphotic posture with FHP  Current:  Needs reiteration - HEP advancement  Status at d/c: good understanding of postural alignment, need for positional changes and ability to self correct, goal met     5.  Patient independent with advanced HEP and symptom management to prepare for d/c to HEP  Status at [facility]: HEP initiated  Current:  Progressing   Status at d/c: independent with HEP, MET      PLAN     [x]  Discharge due to:HEP_  []  Other:_      Dalton Marina, PT, OCS 12/14/2018  1:31 PM

## 2021-06-08 PROBLEM — K21.9 GASTROESOPHAGEAL REFLUX DISEASE WITHOUT ESOPHAGITIS: Status: ACTIVE | Noted: 2020-08-13

## 2021-06-08 PROBLEM — R10.84 GENERALIZED ABDOMINAL PAIN: Status: ACTIVE | Noted: 2020-10-29

## 2022-03-18 PROBLEM — R10.84 GENERALIZED ABDOMINAL PAIN: Status: ACTIVE | Noted: 2020-10-29

## 2022-03-20 PROBLEM — K21.9 GASTROESOPHAGEAL REFLUX DISEASE WITHOUT ESOPHAGITIS: Status: ACTIVE | Noted: 2020-08-13

## 2023-11-27 PROBLEM — D50.9 IRON DEFICIENCY ANEMIA, UNSPECIFIED: Status: ACTIVE | Noted: 2023-11-27

## 2023-11-27 RX ORDER — EPINEPHRINE 1 MG/ML
0.3 INJECTION, SOLUTION, CONCENTRATE INTRAVENOUS PRN
Status: CANCELLED | OUTPATIENT
Start: 2023-12-04

## 2023-11-27 RX ORDER — ONDANSETRON 2 MG/ML
8 INJECTION INTRAMUSCULAR; INTRAVENOUS
Status: CANCELLED | OUTPATIENT
Start: 2023-12-04

## 2023-11-27 RX ORDER — ALBUTEROL SULFATE 90 UG/1
4 AEROSOL, METERED RESPIRATORY (INHALATION) PRN
Status: CANCELLED | OUTPATIENT
Start: 2023-12-04

## 2023-11-27 RX ORDER — DIPHENHYDRAMINE HYDROCHLORIDE 50 MG/ML
50 INJECTION INTRAMUSCULAR; INTRAVENOUS
Status: CANCELLED | OUTPATIENT
Start: 2023-12-04

## 2023-11-27 RX ORDER — HEPARIN 100 UNIT/ML
500 SYRINGE INTRAVENOUS PRN
Status: CANCELLED | OUTPATIENT
Start: 2023-12-04

## 2023-11-27 RX ORDER — SODIUM CHLORIDE 9 MG/ML
INJECTION, SOLUTION INTRAVENOUS CONTINUOUS
Status: CANCELLED | OUTPATIENT
Start: 2023-12-04

## 2023-11-27 RX ORDER — ACETAMINOPHEN 325 MG/1
650 TABLET ORAL
Status: CANCELLED | OUTPATIENT
Start: 2023-12-04

## 2023-12-04 ENCOUNTER — HOSPITAL ENCOUNTER (OUTPATIENT)
Facility: HOSPITAL | Age: 48
Setting detail: INFUSION SERIES
Discharge: HOME | End: 2023-12-04
Payer: COMMERCIAL

## 2023-12-04 VITALS
TEMPERATURE: 98.2 F | OXYGEN SATURATION: 97 % | RESPIRATION RATE: 16 BRPM | DIASTOLIC BLOOD PRESSURE: 89 MMHG | SYSTOLIC BLOOD PRESSURE: 138 MMHG | HEART RATE: 70 BPM

## 2023-12-04 DIAGNOSIS — D50.9 IRON DEFICIENCY ANEMIA, UNSPECIFIED IRON DEFICIENCY ANEMIA TYPE: Primary | ICD-10-CM

## 2023-12-04 PROCEDURE — 96366 THER/PROPH/DIAG IV INF ADDON: CPT

## 2023-12-04 PROCEDURE — 96365 THER/PROPH/DIAG IV INF INIT: CPT

## 2023-12-04 PROCEDURE — 2580000003 HC RX 258: Performed by: PHYSICIAN ASSISTANT

## 2023-12-04 PROCEDURE — 6360000002 HC RX W HCPCS: Performed by: PHYSICIAN ASSISTANT

## 2023-12-04 RX ORDER — AMLODIPINE BESYLATE 2.5 MG/1
2.5 TABLET ORAL DAILY
COMMUNITY

## 2023-12-04 RX ORDER — SODIUM CHLORIDE 0.9 % (FLUSH) 0.9 %
5-40 SYRINGE (ML) INJECTION PRN
OUTPATIENT
Start: 2023-12-11

## 2023-12-04 RX ORDER — EPINEPHRINE 1 MG/ML
0.3 INJECTION, SOLUTION, CONCENTRATE INTRAVENOUS PRN
OUTPATIENT
Start: 2023-12-11

## 2023-12-04 RX ORDER — SODIUM CHLORIDE 9 MG/ML
5-250 INJECTION, SOLUTION INTRAVENOUS PRN
Status: DISCONTINUED | OUTPATIENT
Start: 2023-12-04 | End: 2023-12-05 | Stop reason: HOSPADM

## 2023-12-04 RX ORDER — SODIUM CHLORIDE 0.9 % (FLUSH) 0.9 %
5-40 SYRINGE (ML) INJECTION PRN
Status: DISCONTINUED | OUTPATIENT
Start: 2023-12-04 | End: 2023-12-05 | Stop reason: HOSPADM

## 2023-12-04 RX ORDER — SODIUM CHLORIDE 9 MG/ML
INJECTION, SOLUTION INTRAVENOUS CONTINUOUS
OUTPATIENT
Start: 2023-12-11

## 2023-12-04 RX ORDER — ACETAMINOPHEN 325 MG/1
650 TABLET ORAL
OUTPATIENT
Start: 2023-12-11

## 2023-12-04 RX ORDER — ALBUTEROL SULFATE 90 UG/1
4 AEROSOL, METERED RESPIRATORY (INHALATION) PRN
OUTPATIENT
Start: 2023-12-11

## 2023-12-04 RX ORDER — SODIUM CHLORIDE 9 MG/ML
5-250 INJECTION, SOLUTION INTRAVENOUS PRN
OUTPATIENT
Start: 2023-12-11

## 2023-12-04 RX ORDER — METOCLOPRAMIDE 5 MG/1
5 TABLET ORAL 4 TIMES DAILY
COMMUNITY

## 2023-12-04 RX ORDER — DIPHENHYDRAMINE HYDROCHLORIDE 50 MG/ML
50 INJECTION INTRAMUSCULAR; INTRAVENOUS
OUTPATIENT
Start: 2023-12-11

## 2023-12-04 RX ORDER — ONDANSETRON 2 MG/ML
8 INJECTION INTRAMUSCULAR; INTRAVENOUS
OUTPATIENT
Start: 2023-12-11

## 2023-12-04 RX ORDER — HEPARIN 100 UNIT/ML
500 SYRINGE INTRAVENOUS PRN
OUTPATIENT
Start: 2023-12-11

## 2023-12-04 RX ADMIN — SODIUM CHLORIDE 25 ML/HR: 0.9 INJECTION, SOLUTION INTRAVENOUS at 10:34

## 2023-12-04 RX ADMIN — IRON SUCROSE 300 MG: 20 INJECTION, SOLUTION INTRAVENOUS at 10:40

## 2023-12-04 RX ADMIN — Medication 10 ML: at 10:33

## 2023-12-11 ENCOUNTER — HOSPITAL ENCOUNTER (OUTPATIENT)
Facility: HOSPITAL | Age: 48
Setting detail: INFUSION SERIES
Discharge: HOME | End: 2023-12-11
Payer: COMMERCIAL

## 2023-12-11 VITALS
SYSTOLIC BLOOD PRESSURE: 106 MMHG | HEART RATE: 71 BPM | DIASTOLIC BLOOD PRESSURE: 66 MMHG | RESPIRATION RATE: 18 BRPM | OXYGEN SATURATION: 97 % | TEMPERATURE: 98 F

## 2023-12-11 DIAGNOSIS — D50.9 IRON DEFICIENCY ANEMIA, UNSPECIFIED IRON DEFICIENCY ANEMIA TYPE: Primary | ICD-10-CM

## 2023-12-11 PROCEDURE — 96366 THER/PROPH/DIAG IV INF ADDON: CPT

## 2023-12-11 PROCEDURE — 6360000002 HC RX W HCPCS: Performed by: PHYSICIAN ASSISTANT

## 2023-12-11 PROCEDURE — 2580000003 HC RX 258: Performed by: PHYSICIAN ASSISTANT

## 2023-12-11 PROCEDURE — 96365 THER/PROPH/DIAG IV INF INIT: CPT

## 2023-12-11 RX ORDER — SODIUM CHLORIDE 9 MG/ML
INJECTION, SOLUTION INTRAVENOUS CONTINUOUS
OUTPATIENT
Start: 2023-12-18

## 2023-12-11 RX ORDER — ALBUTEROL SULFATE 90 UG/1
4 AEROSOL, METERED RESPIRATORY (INHALATION) PRN
OUTPATIENT
Start: 2023-12-18

## 2023-12-11 RX ORDER — DIPHENHYDRAMINE HYDROCHLORIDE 50 MG/ML
50 INJECTION INTRAMUSCULAR; INTRAVENOUS
OUTPATIENT
Start: 2023-12-18

## 2023-12-11 RX ORDER — ACETAMINOPHEN 325 MG/1
650 TABLET ORAL
OUTPATIENT
Start: 2023-12-18

## 2023-12-11 RX ORDER — SODIUM CHLORIDE 9 MG/ML
5-250 INJECTION, SOLUTION INTRAVENOUS PRN
Status: DISCONTINUED | OUTPATIENT
Start: 2023-12-11 | End: 2023-12-12 | Stop reason: HOSPADM

## 2023-12-11 RX ORDER — HEPARIN 100 UNIT/ML
500 SYRINGE INTRAVENOUS PRN
OUTPATIENT
Start: 2023-12-18

## 2023-12-11 RX ORDER — EPINEPHRINE 1 MG/ML
0.3 INJECTION, SOLUTION, CONCENTRATE INTRAVENOUS PRN
OUTPATIENT
Start: 2023-12-18

## 2023-12-11 RX ORDER — ONDANSETRON 2 MG/ML
8 INJECTION INTRAMUSCULAR; INTRAVENOUS
OUTPATIENT
Start: 2023-12-18

## 2023-12-11 RX ORDER — SODIUM CHLORIDE 9 MG/ML
5-250 INJECTION, SOLUTION INTRAVENOUS PRN
OUTPATIENT
Start: 2023-12-18

## 2023-12-11 RX ORDER — SODIUM CHLORIDE 0.9 % (FLUSH) 0.9 %
5-40 SYRINGE (ML) INJECTION PRN
OUTPATIENT
Start: 2023-12-18

## 2023-12-11 RX ORDER — SODIUM CHLORIDE 0.9 % (FLUSH) 0.9 %
5-40 SYRINGE (ML) INJECTION PRN
Status: DISCONTINUED | OUTPATIENT
Start: 2023-12-11 | End: 2023-12-12 | Stop reason: HOSPADM

## 2023-12-11 RX ADMIN — Medication 10 ML: at 11:55

## 2023-12-11 RX ADMIN — IRON SUCROSE 300 MG: 20 INJECTION, SOLUTION INTRAVENOUS at 10:10

## 2023-12-11 RX ADMIN — Medication 10 ML: at 10:06

## 2023-12-18 ENCOUNTER — HOSPITAL ENCOUNTER (OUTPATIENT)
Facility: HOSPITAL | Age: 48
Setting detail: INFUSION SERIES
Discharge: HOME | End: 2023-12-18
Payer: COMMERCIAL

## 2023-12-18 VITALS
TEMPERATURE: 98.8 F | OXYGEN SATURATION: 98 % | RESPIRATION RATE: 16 BRPM | DIASTOLIC BLOOD PRESSURE: 85 MMHG | SYSTOLIC BLOOD PRESSURE: 121 MMHG | HEART RATE: 73 BPM

## 2023-12-18 DIAGNOSIS — D50.9 IRON DEFICIENCY ANEMIA, UNSPECIFIED IRON DEFICIENCY ANEMIA TYPE: Primary | ICD-10-CM

## 2023-12-18 PROCEDURE — 6360000002 HC RX W HCPCS: Performed by: PHYSICIAN ASSISTANT

## 2023-12-18 PROCEDURE — 96365 THER/PROPH/DIAG IV INF INIT: CPT

## 2023-12-18 PROCEDURE — 2580000003 HC RX 258: Performed by: PHYSICIAN ASSISTANT

## 2023-12-18 PROCEDURE — 96366 THER/PROPH/DIAG IV INF ADDON: CPT

## 2023-12-18 RX ORDER — ALBUTEROL SULFATE 90 UG/1
4 AEROSOL, METERED RESPIRATORY (INHALATION) PRN
OUTPATIENT
Start: 2023-12-18

## 2023-12-18 RX ORDER — ONDANSETRON 2 MG/ML
8 INJECTION INTRAMUSCULAR; INTRAVENOUS
OUTPATIENT
Start: 2023-12-18

## 2023-12-18 RX ORDER — SODIUM CHLORIDE 0.9 % (FLUSH) 0.9 %
5-40 SYRINGE (ML) INJECTION PRN
OUTPATIENT
Start: 2023-12-18

## 2023-12-18 RX ORDER — ACETAMINOPHEN 325 MG/1
650 TABLET ORAL
OUTPATIENT
Start: 2023-12-18

## 2023-12-18 RX ORDER — DIPHENHYDRAMINE HYDROCHLORIDE 50 MG/ML
50 INJECTION INTRAMUSCULAR; INTRAVENOUS
OUTPATIENT
Start: 2023-12-18

## 2023-12-18 RX ORDER — HEPARIN 100 UNIT/ML
500 SYRINGE INTRAVENOUS PRN
OUTPATIENT
Start: 2023-12-18

## 2023-12-18 RX ORDER — SODIUM CHLORIDE 9 MG/ML
5-250 INJECTION, SOLUTION INTRAVENOUS PRN
OUTPATIENT
Start: 2023-12-18

## 2023-12-18 RX ORDER — SODIUM CHLORIDE 0.9 % (FLUSH) 0.9 %
5-40 SYRINGE (ML) INJECTION PRN
Status: DISCONTINUED | OUTPATIENT
Start: 2023-12-18 | End: 2023-12-19 | Stop reason: HOSPADM

## 2023-12-18 RX ORDER — EPINEPHRINE 1 MG/ML
0.3 INJECTION, SOLUTION, CONCENTRATE INTRAVENOUS PRN
OUTPATIENT
Start: 2023-12-18

## 2023-12-18 RX ORDER — SODIUM CHLORIDE 9 MG/ML
INJECTION, SOLUTION INTRAVENOUS CONTINUOUS
OUTPATIENT
Start: 2023-12-18

## 2023-12-18 RX ADMIN — IRON SUCROSE 300 MG: 20 INJECTION, SOLUTION INTRAVENOUS at 10:20

## 2023-12-18 RX ADMIN — SODIUM CHLORIDE, PRESERVATIVE FREE 10 ML: 5 INJECTION INTRAVENOUS at 10:16

## 2023-12-18 RX ADMIN — SODIUM CHLORIDE, PRESERVATIVE FREE 10 ML: 5 INJECTION INTRAVENOUS at 11:57

## 2025-03-17 RX ORDER — HYDROCORTISONE SODIUM SUCCINATE 100 MG/2ML
100 INJECTION INTRAMUSCULAR; INTRAVENOUS
OUTPATIENT
Start: 2025-03-28

## 2025-03-17 RX ORDER — DIPHENHYDRAMINE HYDROCHLORIDE 50 MG/ML
50 INJECTION, SOLUTION INTRAMUSCULAR; INTRAVENOUS
OUTPATIENT
Start: 2025-03-28

## 2025-03-17 RX ORDER — SODIUM CHLORIDE 9 MG/ML
5-250 INJECTION, SOLUTION INTRAVENOUS PRN
OUTPATIENT
Start: 2025-03-28

## 2025-03-17 RX ORDER — HEPARIN 100 UNIT/ML
500 SYRINGE INTRAVENOUS PRN
OUTPATIENT
Start: 2025-03-28

## 2025-03-17 RX ORDER — MEPERIDINE HYDROCHLORIDE 25 MG/ML
12.5 INJECTION INTRAMUSCULAR; INTRAVENOUS; SUBCUTANEOUS PRN
OUTPATIENT
Start: 2025-03-28

## 2025-03-17 RX ORDER — SODIUM CHLORIDE 9 MG/ML
INJECTION, SOLUTION INTRAVENOUS CONTINUOUS
OUTPATIENT
Start: 2025-03-28

## 2025-03-17 RX ORDER — ACETAMINOPHEN 325 MG/1
650 TABLET ORAL
OUTPATIENT
Start: 2025-03-28

## 2025-03-17 RX ORDER — EPINEPHRINE 1 MG/ML
0.3 INJECTION, SOLUTION INTRAMUSCULAR; SUBCUTANEOUS PRN
OUTPATIENT
Start: 2025-03-28

## 2025-03-17 RX ORDER — DEXAMETHASONE SODIUM PHOSPHATE 4 MG/ML
10 INJECTION, SOLUTION INTRA-ARTICULAR; INTRALESIONAL; INTRAMUSCULAR; INTRAVENOUS; SOFT TISSUE ONCE
Status: CANCELLED | OUTPATIENT
Start: 2025-03-28 | End: 2025-03-31

## 2025-03-17 RX ORDER — ALBUTEROL SULFATE 90 UG/1
4 INHALANT RESPIRATORY (INHALATION) PRN
OUTPATIENT
Start: 2025-03-28

## 2025-03-17 RX ORDER — ONDANSETRON 2 MG/ML
8 INJECTION INTRAMUSCULAR; INTRAVENOUS
OUTPATIENT
Start: 2025-03-28

## 2025-03-28 ENCOUNTER — HOSPITAL ENCOUNTER (OUTPATIENT)
Facility: HOSPITAL | Age: 50
Setting detail: INFUSION SERIES
Discharge: HOME | End: 2025-03-28
Payer: COMMERCIAL

## 2025-03-28 VITALS
OXYGEN SATURATION: 96 % | SYSTOLIC BLOOD PRESSURE: 120 MMHG | RESPIRATION RATE: 16 BRPM | TEMPERATURE: 97.8 F | HEART RATE: 76 BPM | DIASTOLIC BLOOD PRESSURE: 78 MMHG

## 2025-03-28 DIAGNOSIS — D50.9 IRON DEFICIENCY ANEMIA, UNSPECIFIED IRON DEFICIENCY ANEMIA TYPE: Primary | ICD-10-CM

## 2025-03-28 PROCEDURE — 2500000003 HC RX 250 WO HCPCS: Performed by: PHYSICIAN ASSISTANT

## 2025-03-28 PROCEDURE — 2580000003 HC RX 258: Performed by: PHYSICIAN ASSISTANT

## 2025-03-28 PROCEDURE — 6370000000 HC RX 637 (ALT 250 FOR IP): Performed by: PHYSICIAN ASSISTANT

## 2025-03-28 PROCEDURE — 6360000002 HC RX W HCPCS: Performed by: PHYSICIAN ASSISTANT

## 2025-03-28 PROCEDURE — 96365 THER/PROPH/DIAG IV INF INIT: CPT

## 2025-03-28 PROCEDURE — 96366 THER/PROPH/DIAG IV INF ADDON: CPT

## 2025-03-28 PROCEDURE — 96376 TX/PRO/DX INJ SAME DRUG ADON: CPT

## 2025-03-28 RX ORDER — ACETAMINOPHEN 325 MG/1
650 TABLET ORAL ONCE
Status: COMPLETED | OUTPATIENT
Start: 2025-03-28 | End: 2025-03-28

## 2025-03-28 RX ORDER — HEPARIN 100 UNIT/ML
500 SYRINGE INTRAVENOUS PRN
OUTPATIENT
Start: 2025-03-28

## 2025-03-28 RX ORDER — ACETAMINOPHEN 325 MG/1
650 TABLET ORAL ONCE
Status: CANCELLED | OUTPATIENT
Start: 2025-03-28 | End: 2025-03-28

## 2025-03-28 RX ORDER — SODIUM CHLORIDE 9 MG/ML
INJECTION, SOLUTION INTRAVENOUS CONTINUOUS
OUTPATIENT
Start: 2025-03-28

## 2025-03-28 RX ORDER — SODIUM CHLORIDE 0.9 % (FLUSH) 0.9 %
5-40 SYRINGE (ML) INJECTION PRN
OUTPATIENT
Start: 2025-03-28

## 2025-03-28 RX ORDER — DIPHENHYDRAMINE HCL 25 MG
25 CAPSULE ORAL ONCE
Status: COMPLETED | OUTPATIENT
Start: 2025-03-28 | End: 2025-03-28

## 2025-03-28 RX ORDER — EPINEPHRINE 1 MG/ML
0.3 INJECTION, SOLUTION INTRAMUSCULAR; SUBCUTANEOUS PRN
OUTPATIENT
Start: 2025-03-28

## 2025-03-28 RX ORDER — SODIUM CHLORIDE 9 MG/ML
5-250 INJECTION, SOLUTION INTRAVENOUS PRN
OUTPATIENT
Start: 2025-03-28

## 2025-03-28 RX ORDER — SODIUM CHLORIDE 0.9 % (FLUSH) 0.9 %
5-40 SYRINGE (ML) INJECTION PRN
Status: DISCONTINUED | OUTPATIENT
Start: 2025-03-28 | End: 2025-03-29 | Stop reason: HOSPADM

## 2025-03-28 RX ORDER — DIPHENHYDRAMINE HYDROCHLORIDE 50 MG/ML
50 INJECTION, SOLUTION INTRAMUSCULAR; INTRAVENOUS
OUTPATIENT
Start: 2025-03-28

## 2025-03-28 RX ORDER — ACETAMINOPHEN 325 MG/1
650 TABLET ORAL
OUTPATIENT
Start: 2025-03-28

## 2025-03-28 RX ORDER — ALBUTEROL SULFATE 90 UG/1
4 INHALANT RESPIRATORY (INHALATION) PRN
OUTPATIENT
Start: 2025-03-28

## 2025-03-28 RX ORDER — HYDROCORTISONE SODIUM SUCCINATE 100 MG/2ML
100 INJECTION INTRAMUSCULAR; INTRAVENOUS
OUTPATIENT
Start: 2025-03-28

## 2025-03-28 RX ORDER — DIPHENHYDRAMINE HCL 25 MG
25 CAPSULE ORAL ONCE
Status: CANCELLED
Start: 2025-03-28 | End: 2025-03-28

## 2025-03-28 RX ORDER — MEPERIDINE HYDROCHLORIDE 25 MG/ML
12.5 INJECTION INTRAMUSCULAR; INTRAVENOUS; SUBCUTANEOUS PRN
OUTPATIENT
Start: 2025-03-28

## 2025-03-28 RX ORDER — ONDANSETRON 2 MG/ML
8 INJECTION INTRAMUSCULAR; INTRAVENOUS
OUTPATIENT
Start: 2025-03-28

## 2025-03-28 RX ADMIN — DIPHENHYDRAMINE HYDROCHLORIDE 25 MG: 25 CAPSULE ORAL at 08:30

## 2025-03-28 RX ADMIN — Medication 10 ML: at 14:08

## 2025-03-28 RX ADMIN — SODIUM CHLORIDE 975 MG: 9 INJECTION, SOLUTION INTRAVENOUS at 10:23

## 2025-03-28 RX ADMIN — ACETAMINOPHEN 650 MG: 325 TABLET, FILM COATED ORAL at 08:29

## 2025-03-28 RX ADMIN — Medication 10 ML: at 09:18

## 2025-03-28 RX ADMIN — SODIUM CHLORIDE 25 MG: 9 INJECTION, SOLUTION INTRAVENOUS at 09:02

## 2025-03-28 NOTE — PROGRESS NOTES
[facility] Progress Note    Date: 2025    Name: Maci Saucedo    MRN: [id number]         : 1975    INFED      Ms. Saucedo arrived to [facility] at 0757, ambulatory.    Ms. Saucedo was assessed and education was provided. Patient received Venofer here at [facility] in , but stated that she has never received Infed. She denied any complications from her Venofer infusions. [facility] educational pamphlet on Infed was reviewed with patient, signature page signed, and all questions were answered. She had no concerns regarding her treatment today.     Ms. Saucedo's vitals were reviewed.  Vitals:    25 0815   BP: 112/67   Pulse: 61   Resp: 16   Temp: 98 °F (36.7 °C)   SpO2: 97%       #24 g IV inserted in patient's left hand x 1 attempt.  Positive for blood return/ flushed with 10 ml NS without difficulty. Tegaderm dressing applied. Patient tolerated well.     Pre-medications (Tylenol 650 mg PO and Benadryl 25 mg PO) were administered as ordered.    30 minutes after pre-meds were given, INFED test dose 25 mg in 50 ml 0.9% NS was administered over approximately 10 minutes as per MD order, followed by 10 ml NS flush.     Ms. Saucedo tolerated the test dose well. Vitals stable. She denied any SOB, chest pain, throat tightness, lip/tongue/facial swelling, rash/itching/hives or any other complaints.     Patient was closely observed for one hour following the test dose. About 30 minutes into the hour's observation, patient c/o feeling a little \"dizzy\". Vitals taken and stable. She had no other complaints.    After the one hour of observation, patient stated that she no longer felt \"dizzy\". Vitals remained stable.She denied any other complaints.      Infed 975 mg in 250 ml 0.9% NS was administered over approximately 4 hours as per MD order, followed by 10 ml NS flush.     Ms. Saucedo tolerated the infusion well. Vitals stable, and she had no complaints.     Patient remained for 30 minutes post infusion for

## 2025-03-28 NOTE — PROGRESS NOTES
[facility].       March 28, 2025       RE: Maci Saucedo      To Whom It May Concern,    This is to certify that Maci Saucedo may return to work on Monday 3/31/25.     Please feel free to contact my office if you have any questions or concerns.  Thank you for your assistance in this matter.      Sincerely,  Sarahy Glaser RN

## 2025-04-22 NOTE — H&P
[facility]  [address]  [facility] [address]  Tel:  [phone]  Fax: [phone]    Patient: Maci Saucedo    YOB: 1975   Birth Sex: Female   Date: 04/22/2025 09:20 AM   Visit Type: Office Visit - GYN     Assessment/Plan  # Detail Type Description    1. Assessment Excessive and frequent menstruation with regular cycle (N92.0).    Patient Plan Scheduled for RA LSH/bilateral salpingectomy.  The procedure was discussed with the patient in detail.  She understands that the risks include but are not limited to: Heavy bleeding, need for blood transfusion, infection, injury to internal or adjacent organs, risks associated with anesthesia, wound infection or separation, DVT, pulmonary embolus, pneumonia and death associated with surgery.  All questions were answered and the patient wants to proceed.         2. Assessment Intramural leiomyoma of uterus (D25.1).         3. Assessment Pelvic pain (R10.2).              This 50 year old patient presents for Excessive and Frequent Menstruation:.    History of Present Illness  1.  Excessive and Frequent Menstruation:   The symptoms began 4 months ago and generally lasts varies. The symptoms are reported as being moderate. The symptoms occur randomly. The location is uterine. The symptoms are described as cramping. Aggravating factors include menses. Relieving factors include time. The patient states the symptoms are acute and are unchanged. Patient presents for pre operative visit. Patient is scheduled for a Robotic assisted LSH/bilateral salpingectomy.            Gynecologic History  04/22/2025 09:20 AM  Date of last Pap: 12/06/2023.  Pregnancy # Birth order Date Delivery Type GA(wks) Labor(hrs) Weight Sex   1  [id number]        Medical History  (Detailed)  Disease Onset Date Comments   Fibroids     Hypertension         Surgical History/Management  (Detailed)  Management Date Comments   Myomectomy 2010      Diagnostics

## 2025-04-23 ENCOUNTER — HOSPITAL ENCOUNTER (OUTPATIENT)
Facility: HOSPITAL | Age: 50
Discharge: HOME | End: 2025-04-26
Payer: COMMERCIAL

## 2025-04-23 DIAGNOSIS — D25.1 INTRAMURAL LEIOMYOMA OF UTERUS: ICD-10-CM

## 2025-04-23 DIAGNOSIS — N92.1 METRORRHAGIA: ICD-10-CM

## 2025-04-23 DIAGNOSIS — N92.0 EXCESSIVE OR FREQUENT MENSTRUATION: ICD-10-CM

## 2025-04-23 DIAGNOSIS — D25.2 SUBSEROUS LEIOMYOMA OF UTERUS: ICD-10-CM

## 2025-04-23 DIAGNOSIS — R10.2 PELVIC PAIN SYNDROME: ICD-10-CM

## 2025-04-23 LAB
ABO + RH BLD: NORMAL
ANION GAP SERPL CALC-SCNC: 7 MMOL/L (ref 3–18)
BASOPHILS # BLD: 0.03 K/UL (ref 0–0.1)
BASOPHILS NFR BLD: 0.6 % (ref 0–2)
BLOOD GROUP ANTIBODIES SERPL: NORMAL
BUN SERPL-MCNC: 6 MG/DL (ref 7–18)
BUN/CREAT SERPL: 6 (ref 12–20)
CALCIUM SERPL-MCNC: 9.1 MG/DL (ref 8.5–10.1)
CHLORIDE SERPL-SCNC: 103 MMOL/L (ref 100–111)
CO2 SERPL-SCNC: 30 MMOL/L (ref 21–32)
CREAT SERPL-MCNC: 0.95 MG/DL (ref 0.6–1.3)
DIFFERENTIAL METHOD BLD: ABNORMAL
EOSINOPHIL # BLD: 0.12 K/UL (ref 0–0.4)
EOSINOPHIL NFR BLD: 2.3 % (ref 0–5)
ERYTHROCYTE [DISTWIDTH] IN BLOOD BY AUTOMATED COUNT: 24.1 % (ref 11.6–14.5)
GLUCOSE SERPL-MCNC: 86 MG/DL (ref 74–99)
HCT VFR BLD AUTO: 40.7 % (ref 35–45)
HGB BLD-MCNC: 12.8 G/DL (ref 12–16)
IMM GRANULOCYTES # BLD AUTO: 0.02 K/UL (ref 0–0.04)
IMM GRANULOCYTES NFR BLD AUTO: 0.4 % (ref 0–0.5)
LYMPHOCYTES # BLD: 1.79 K/UL (ref 0.9–3.3)
LYMPHOCYTES NFR BLD: 33.8 % (ref 21–52)
MCH RBC QN AUTO: 27 PG (ref 24–34)
MCHC RBC AUTO-ENTMCNC: 31.4 G/DL (ref 31–37)
MCV RBC AUTO: 85.9 FL (ref 78–100)
MONOCYTES # BLD: 0.33 K/UL (ref 0.05–1.2)
MONOCYTES NFR BLD: 6.3 % (ref 3–10)
NEUTS SEG # BLD: 3.01 K/UL (ref 1.8–8)
NEUTS SEG NFR BLD: 56.6 % (ref 40–73)
NRBC # BLD: 0 K/UL (ref 0–0.01)
NRBC BLD-RTO: 0 PER 100 WBC
PLATELET # BLD AUTO: 345 K/UL (ref 135–420)
PLATELET COMMENT: ABNORMAL
PMV BLD AUTO: 8.9 FL (ref 9.2–11.8)
POTASSIUM SERPL-SCNC: 3.5 MMOL/L (ref 3.5–5.5)
RBC # BLD AUTO: 4.74 M/UL (ref 4.2–5.3)
RBC MORPH BLD: ABNORMAL
RBC MORPH BLD: ABNORMAL
SODIUM SERPL-SCNC: 140 MMOL/L (ref 136–145)
SPECIMEN EXP DATE BLD: NORMAL
WBC # BLD AUTO: 5.3 K/UL (ref 4.6–13.2)

## 2025-04-23 PROCEDURE — 85025 COMPLETE CBC W/AUTO DIFF WBC: CPT

## 2025-04-23 PROCEDURE — 36415 COLL VENOUS BLD VENIPUNCTURE: CPT

## 2025-04-23 PROCEDURE — 86901 BLOOD TYPING SEROLOGIC RH(D): CPT

## 2025-04-23 PROCEDURE — 80048 BASIC METABOLIC PNL TOTAL CA: CPT

## 2025-04-23 PROCEDURE — 86850 RBC ANTIBODY SCREEN: CPT

## 2025-04-23 PROCEDURE — 93005 ELECTROCARDIOGRAM TRACING: CPT

## 2025-04-23 PROCEDURE — 86900 BLOOD TYPING SEROLOGIC ABO: CPT

## 2025-04-25 LAB
EKG ATRIAL RATE: 64 BPM
EKG DIAGNOSIS: NORMAL
EKG P AXIS: 66 DEGREES
EKG P-R INTERVAL: 166 MS
EKG Q-T INTERVAL: 418 MS
EKG QRS DURATION: 70 MS
EKG QTC CALCULATION (BAZETT): 431 MS
EKG R AXIS: 32 DEGREES
EKG T AXIS: 39 DEGREES
EKG VENTRICULAR RATE: 64 BPM

## 2025-04-25 PROCEDURE — 93010 ELECTROCARDIOGRAM REPORT: CPT | Performed by: INTERNAL MEDICINE

## 2025-05-01 ENCOUNTER — HOSPITAL ENCOUNTER (OUTPATIENT)
Facility: HOSPITAL | Age: 50
Setting detail: OUTPATIENT SURGERY
Discharge: HOME | End: 2025-05-01
Attending: OBSTETRICS & GYNECOLOGY | Admitting: OBSTETRICS & GYNECOLOGY
Payer: COMMERCIAL

## 2025-05-01 ENCOUNTER — ANESTHESIA EVENT (OUTPATIENT)
Facility: HOSPITAL | Age: 50
End: 2025-05-01
Payer: COMMERCIAL

## 2025-05-01 ENCOUNTER — ANESTHESIA (OUTPATIENT)
Facility: HOSPITAL | Age: 50
End: 2025-05-01
Payer: COMMERCIAL

## 2025-05-01 VITALS
HEIGHT: 66 IN | RESPIRATION RATE: 14 BRPM | TEMPERATURE: 97.9 F | OXYGEN SATURATION: 100 % | SYSTOLIC BLOOD PRESSURE: 123 MMHG | HEART RATE: 79 BPM | WEIGHT: 190.3 LBS | BODY MASS INDEX: 30.58 KG/M2 | DIASTOLIC BLOOD PRESSURE: 77 MMHG

## 2025-05-01 DIAGNOSIS — Z09 SURGICAL FOLLOW-UP CARE: Primary | ICD-10-CM

## 2025-05-01 LAB — HCG UR QL: NEGATIVE

## 2025-05-01 PROCEDURE — 2709999900 HC NON-CHARGEABLE SUPPLY: Performed by: OBSTETRICS & GYNECOLOGY

## 2025-05-01 PROCEDURE — 3600000019 HC SURGERY ROBOT ADDTL 15MIN: Performed by: OBSTETRICS & GYNECOLOGY

## 2025-05-01 PROCEDURE — 88307 TISSUE EXAM BY PATHOLOGIST: CPT

## 2025-05-01 PROCEDURE — S2900 ROBOTIC SURGICAL SYSTEM: HCPCS | Performed by: OBSTETRICS & GYNECOLOGY

## 2025-05-01 PROCEDURE — 7100000000 HC PACU RECOVERY - FIRST 15 MIN: Performed by: OBSTETRICS & GYNECOLOGY

## 2025-05-01 PROCEDURE — 3600000009 HC SURGERY ROBOT BASE: Performed by: OBSTETRICS & GYNECOLOGY

## 2025-05-01 PROCEDURE — 6360000002 HC RX W HCPCS: Performed by: SPECIALIST

## 2025-05-01 PROCEDURE — 6360000002 HC RX W HCPCS: Performed by: OBSTETRICS & GYNECOLOGY

## 2025-05-01 PROCEDURE — 6360000002 HC RX W HCPCS: Performed by: REGISTERED NURSE

## 2025-05-01 PROCEDURE — 7100000001 HC PACU RECOVERY - ADDTL 15 MIN: Performed by: OBSTETRICS & GYNECOLOGY

## 2025-05-01 PROCEDURE — 2500000003 HC RX 250 WO HCPCS: Performed by: REGISTERED NURSE

## 2025-05-01 PROCEDURE — 3700000001 HC ADD 15 MINUTES (ANESTHESIA): Performed by: OBSTETRICS & GYNECOLOGY

## 2025-05-01 PROCEDURE — 81025 URINE PREGNANCY TEST: CPT

## 2025-05-01 PROCEDURE — 7100000010 HC PHASE II RECOVERY - FIRST 15 MIN: Performed by: OBSTETRICS & GYNECOLOGY

## 2025-05-01 PROCEDURE — 2580000003 HC RX 258: Performed by: OBSTETRICS & GYNECOLOGY

## 2025-05-01 PROCEDURE — 7100000011 HC PHASE II RECOVERY - ADDTL 15 MIN: Performed by: OBSTETRICS & GYNECOLOGY

## 2025-05-01 PROCEDURE — 3700000000 HC ANESTHESIA ATTENDED CARE: Performed by: OBSTETRICS & GYNECOLOGY

## 2025-05-01 PROCEDURE — 2720000010 HC SURG SUPPLY STERILE: Performed by: OBSTETRICS & GYNECOLOGY

## 2025-05-01 RX ORDER — LABETALOL HYDROCHLORIDE 5 MG/ML
10 INJECTION, SOLUTION INTRAVENOUS
Status: DISCONTINUED | OUTPATIENT
Start: 2025-05-01 | End: 2025-05-01 | Stop reason: HOSPADM

## 2025-05-01 RX ORDER — MIDAZOLAM HYDROCHLORIDE 1 MG/ML
INJECTION, SOLUTION INTRAMUSCULAR; INTRAVENOUS
Status: DISCONTINUED | OUTPATIENT
Start: 2025-05-01 | End: 2025-05-01 | Stop reason: SDUPTHER

## 2025-05-01 RX ORDER — SODIUM CHLORIDE 9 MG/ML
INJECTION, SOLUTION INTRAVENOUS PRN
Status: DISCONTINUED | OUTPATIENT
Start: 2025-05-01 | End: 2025-05-01 | Stop reason: HOSPADM

## 2025-05-01 RX ORDER — SODIUM CHLORIDE 9 MG/ML
INJECTION, SOLUTION INTRAVENOUS CONTINUOUS
Status: DISCONTINUED | OUTPATIENT
Start: 2025-05-01 | End: 2025-05-01 | Stop reason: HOSPADM

## 2025-05-01 RX ORDER — ACETAMINOPHEN 500 MG
1000 TABLET ORAL EVERY 6 HOURS PRN
COMMUNITY

## 2025-05-01 RX ORDER — NALOXONE HYDROCHLORIDE 0.4 MG/ML
INJECTION, SOLUTION INTRAMUSCULAR; INTRAVENOUS; SUBCUTANEOUS PRN
Status: DISCONTINUED | OUTPATIENT
Start: 2025-05-01 | End: 2025-05-01 | Stop reason: HOSPADM

## 2025-05-01 RX ORDER — SODIUM CHLORIDE 0.9 % (FLUSH) 0.9 %
5-40 SYRINGE (ML) INJECTION PRN
Status: DISCONTINUED | OUTPATIENT
Start: 2025-05-01 | End: 2025-05-01 | Stop reason: HOSPADM

## 2025-05-01 RX ORDER — DEXAMETHASONE SODIUM PHOSPHATE 4 MG/ML
INJECTION, SOLUTION INTRA-ARTICULAR; INTRALESIONAL; INTRAMUSCULAR; INTRAVENOUS; SOFT TISSUE
Status: DISCONTINUED | OUTPATIENT
Start: 2025-05-01 | End: 2025-05-01 | Stop reason: SDUPTHER

## 2025-05-01 RX ORDER — GLYCOPYRROLATE 0.2 MG/ML
INJECTION INTRAMUSCULAR; INTRAVENOUS
Status: DISCONTINUED | OUTPATIENT
Start: 2025-05-01 | End: 2025-05-01 | Stop reason: SDUPTHER

## 2025-05-01 RX ORDER — SODIUM CHLORIDE, SODIUM LACTATE, POTASSIUM CHLORIDE, CALCIUM CHLORIDE 600; 310; 30; 20 MG/100ML; MG/100ML; MG/100ML; MG/100ML
INJECTION, SOLUTION INTRAVENOUS CONTINUOUS
Status: DISCONTINUED | OUTPATIENT
Start: 2025-05-01 | End: 2025-05-01 | Stop reason: HOSPADM

## 2025-05-01 RX ORDER — ONDANSETRON 2 MG/ML
INJECTION INTRAMUSCULAR; INTRAVENOUS
Status: DISCONTINUED | OUTPATIENT
Start: 2025-05-01 | End: 2025-05-01 | Stop reason: SDUPTHER

## 2025-05-01 RX ORDER — LIDOCAINE HYDROCHLORIDE 20 MG/ML
INJECTION, SOLUTION EPIDURAL; INFILTRATION; INTRACAUDAL; PERINEURAL
Status: DISCONTINUED | OUTPATIENT
Start: 2025-05-01 | End: 2025-05-01 | Stop reason: SDUPTHER

## 2025-05-01 RX ORDER — FENTANYL CITRATE 50 UG/ML
25 INJECTION, SOLUTION INTRAMUSCULAR; INTRAVENOUS EVERY 5 MIN PRN
Status: COMPLETED | OUTPATIENT
Start: 2025-05-01 | End: 2025-05-01

## 2025-05-01 RX ORDER — BUPIVACAINE HYDROCHLORIDE 2.5 MG/ML
INJECTION, SOLUTION EPIDURAL; INFILTRATION; INTRACAUDAL PRN
Status: DISCONTINUED | OUTPATIENT
Start: 2025-05-01 | End: 2025-05-01 | Stop reason: ALTCHOICE

## 2025-05-01 RX ORDER — FENTANYL CITRATE 50 UG/ML
INJECTION, SOLUTION INTRAMUSCULAR; INTRAVENOUS
Status: DISCONTINUED | OUTPATIENT
Start: 2025-05-01 | End: 2025-05-01 | Stop reason: SDUPTHER

## 2025-05-01 RX ORDER — ROCURONIUM BROMIDE 10 MG/ML
INJECTION, SOLUTION INTRAVENOUS
Status: DISCONTINUED | OUTPATIENT
Start: 2025-05-01 | End: 2025-05-01 | Stop reason: SDUPTHER

## 2025-05-01 RX ORDER — OXYCODONE AND ACETAMINOPHEN 5; 325 MG/1; MG/1
1 TABLET ORAL EVERY 6 HOURS PRN
Qty: 28 TABLET | Refills: 0 | Status: SHIPPED | OUTPATIENT
Start: 2025-05-01 | End: 2025-05-08

## 2025-05-01 RX ORDER — PROPOFOL 10 MG/ML
INJECTION, EMULSION INTRAVENOUS
Status: DISCONTINUED | OUTPATIENT
Start: 2025-05-01 | End: 2025-05-01 | Stop reason: SDUPTHER

## 2025-05-01 RX ORDER — ONDANSETRON 2 MG/ML
4 INJECTION INTRAMUSCULAR; INTRAVENOUS
Status: DISCONTINUED | OUTPATIENT
Start: 2025-05-01 | End: 2025-05-01 | Stop reason: HOSPADM

## 2025-05-01 RX ORDER — SODIUM CHLORIDE 0.9 % (FLUSH) 0.9 %
5-40 SYRINGE (ML) INJECTION EVERY 12 HOURS SCHEDULED
Status: DISCONTINUED | OUTPATIENT
Start: 2025-05-01 | End: 2025-05-01 | Stop reason: HOSPADM

## 2025-05-01 RX ORDER — DROPERIDOL 2.5 MG/ML
0.62 INJECTION, SOLUTION INTRAMUSCULAR; INTRAVENOUS
Status: DISCONTINUED | OUTPATIENT
Start: 2025-05-01 | End: 2025-05-01 | Stop reason: HOSPADM

## 2025-05-01 RX ORDER — DEXMEDETOMIDINE HYDROCHLORIDE 100 UG/ML
INJECTION, SOLUTION INTRAVENOUS
Status: DISCONTINUED | OUTPATIENT
Start: 2025-05-01 | End: 2025-05-01 | Stop reason: SDUPTHER

## 2025-05-01 RX ORDER — DIPHENHYDRAMINE HYDROCHLORIDE 50 MG/ML
12.5 INJECTION, SOLUTION INTRAMUSCULAR; INTRAVENOUS
Status: DISCONTINUED | OUTPATIENT
Start: 2025-05-01 | End: 2025-05-01 | Stop reason: HOSPADM

## 2025-05-01 RX ORDER — HYDROMORPHONE HYDROCHLORIDE 1 MG/ML
0.25 INJECTION, SOLUTION INTRAMUSCULAR; INTRAVENOUS; SUBCUTANEOUS EVERY 5 MIN PRN
Status: DISCONTINUED | OUTPATIENT
Start: 2025-05-01 | End: 2025-05-01 | Stop reason: HOSPADM

## 2025-05-01 RX ORDER — OXYCODONE HYDROCHLORIDE 5 MG/1
5 TABLET ORAL
Status: DISCONTINUED | OUTPATIENT
Start: 2025-05-01 | End: 2025-05-01 | Stop reason: HOSPADM

## 2025-05-01 RX ADMIN — HYDROMORPHONE HYDROCHLORIDE 0.25 MG: 1 INJECTION, SOLUTION INTRAMUSCULAR; INTRAVENOUS; SUBCUTANEOUS at 16:49

## 2025-05-01 RX ADMIN — FENTANYL CITRATE 50 MCG: 50 INJECTION INTRAMUSCULAR; INTRAVENOUS at 14:19

## 2025-05-01 RX ADMIN — SUGAMMADEX 400 MG: 100 INJECTION, SOLUTION INTRAVENOUS at 16:04

## 2025-05-01 RX ADMIN — Medication 20 MG: at 14:23

## 2025-05-01 RX ADMIN — ROCURONIUM BROMIDE 30 MG: 10 INJECTION, SOLUTION INTRAVENOUS at 14:23

## 2025-05-01 RX ADMIN — GLYCOPYRROLATE 0.1 MG: 0.2 INJECTION INTRAMUSCULAR; INTRAVENOUS at 15:04

## 2025-05-01 RX ADMIN — DEXMEDETOMIDINE HYDROCHLORIDE 4 MCG: 100 INJECTION, SOLUTION INTRAVENOUS at 15:03

## 2025-05-01 RX ADMIN — Medication 2000 MG: at 14:30

## 2025-05-01 RX ADMIN — ROCURONIUM BROMIDE 10 MG: 10 INJECTION, SOLUTION INTRAVENOUS at 14:24

## 2025-05-01 RX ADMIN — FENTANYL CITRATE 25 MCG: 50 INJECTION INTRAMUSCULAR; INTRAVENOUS at 16:39

## 2025-05-01 RX ADMIN — LIDOCAINE HYDROCHLORIDE 60 MG: 20 INJECTION, SOLUTION EPIDURAL; INFILTRATION; INTRACAUDAL; PERINEURAL at 14:21

## 2025-05-01 RX ADMIN — SODIUM CHLORIDE: 0.9 INJECTION, SOLUTION INTRAVENOUS at 10:36

## 2025-05-01 RX ADMIN — PROPOFOL 200 MG: 10 INJECTION, EMULSION INTRAVENOUS at 14:23

## 2025-05-01 RX ADMIN — FENTANYL CITRATE 25 MCG: 50 INJECTION INTRAMUSCULAR; INTRAVENOUS at 16:29

## 2025-05-01 RX ADMIN — ONDANSETRON HYDROCHLORIDE 4 MG: 2 INJECTION INTRAMUSCULAR; INTRAVENOUS at 14:34

## 2025-05-01 RX ADMIN — ROCURONIUM BROMIDE 10 MG: 10 INJECTION, SOLUTION INTRAVENOUS at 14:34

## 2025-05-01 RX ADMIN — FENTANYL CITRATE 50 MCG: 50 INJECTION INTRAMUSCULAR; INTRAVENOUS at 14:42

## 2025-05-01 RX ADMIN — FENTANYL CITRATE 25 MCG: 50 INJECTION INTRAMUSCULAR; INTRAVENOUS at 16:24

## 2025-05-01 RX ADMIN — SODIUM CHLORIDE: 0.9 INJECTION, SOLUTION INTRAVENOUS at 16:40

## 2025-05-01 RX ADMIN — Medication 20 MG: at 14:42

## 2025-05-01 RX ADMIN — FENTANYL CITRATE 25 MCG: 50 INJECTION INTRAMUSCULAR; INTRAVENOUS at 16:33

## 2025-05-01 RX ADMIN — DEXAMETHASONE SODIUM PHOSPHATE 4 MG: 4 INJECTION, SOLUTION INTRAMUSCULAR; INTRAVENOUS at 14:33

## 2025-05-01 RX ADMIN — MIDAZOLAM 2 MG: 1 INJECTION INTRAMUSCULAR; INTRAVENOUS at 14:18

## 2025-05-01 RX ADMIN — ROCURONIUM BROMIDE 10 MG: 10 INJECTION, SOLUTION INTRAVENOUS at 15:44

## 2025-05-01 RX ADMIN — SODIUM CHLORIDE: 0.9 INJECTION, SOLUTION INTRAVENOUS at 16:17

## 2025-05-01 RX ADMIN — LIDOCAINE HYDROCHLORIDE 40 MG: 20 INJECTION, SOLUTION EPIDURAL; INFILTRATION; INTRACAUDAL; PERINEURAL at 15:50

## 2025-05-01 RX ADMIN — DEXMEDETOMIDINE HYDROCHLORIDE 6 MCG: 100 INJECTION, SOLUTION INTRAVENOUS at 14:56

## 2025-05-01 ASSESSMENT — PAIN - FUNCTIONAL ASSESSMENT
PAIN_FUNCTIONAL_ASSESSMENT: PREVENTS OR INTERFERES SOME ACTIVE ACTIVITIES AND ADLS
PAIN_FUNCTIONAL_ASSESSMENT: PREVENTS OR INTERFERES WITH MANY ACTIVE NOT PASSIVE ACTIVITIES
PAIN_FUNCTIONAL_ASSESSMENT: PREVENTS OR INTERFERES SOME ACTIVE ACTIVITIES AND ADLS

## 2025-05-01 ASSESSMENT — PAIN SCALES - GENERAL
PAINLEVEL_OUTOF10: 6
PAINLEVEL_OUTOF10: 7
PAINLEVEL_OUTOF10: 6
PAINLEVEL_OUTOF10: 0

## 2025-05-01 ASSESSMENT — PAIN DESCRIPTION - FREQUENCY
FREQUENCY: CONTINUOUS

## 2025-05-01 ASSESSMENT — PAIN DESCRIPTION - DESCRIPTORS
DESCRIPTORS: SORE

## 2025-05-01 ASSESSMENT — PAIN DESCRIPTION - PAIN TYPE
TYPE: SURGICAL PAIN

## 2025-05-01 ASSESSMENT — PAIN DESCRIPTION - ORIENTATION
ORIENTATION: MID;LOWER
ORIENTATION: MID;LOWER
ORIENTATION: LOWER;MID
ORIENTATION: MID
ORIENTATION: MID

## 2025-05-01 ASSESSMENT — PAIN DESCRIPTION - ONSET
ONSET: ON-GOING

## 2025-05-01 ASSESSMENT — PAIN DESCRIPTION - LOCATION
LOCATION: ABDOMEN

## 2025-05-01 NOTE — PERIOP NOTE
Patient and family notified of delay. Per patient request for comfort, disconnected IV fluids to allow patient to stand and stretch legs. Safety instructions provided. Call light within reach. Belongings at bedside with patient. Will continue to monitor.

## 2025-05-01 NOTE — ANESTHESIA PRE PROCEDURE
Department of Anesthesiology  Preprocedure Note       Name:  Maci Saucedo   Age:  50 y.o.  :  1975                                          MRN:  [id number]         Date:  2025      Surgeon: Surgeon(s):  Taurus Chan MD    Procedure: Procedure(s):  ROBOTIC ASSISTED LAPAROSCOPIC SUPRACERVICAL HYSTERECTOMY, BILATERAL SALPINGECTOMY ERAS \"SPEC POP\"    Medications prior to admission:   Prior to Admission medications    Medication Sig Start Date End Date Taking? Authorizing Provider   acetaminophen (TYLENOL) 500 MG tablet Take 2 tablets by mouth every 6 hours as needed for Pain   Yes Provider, MD Ayesha   amLODIPine (NORVASC) 2.5 MG tablet Take 1 tablet by mouth at bedtime   Yes ProviderAyesha MD   metoclopramide (REGLAN) 5 MG tablet Take 1 tablet by mouth in the morning, at noon, and at bedtime   Yes ProviderAyesha MD   metoprolol succinate (TOPROL XL) 50 MG extended release tablet 1 tablet at bedtime 6/3/21  Yes Automatic Reconciliation, Ar   albuterol sulfate (PROAIR RESPICLICK) 108 (90 Base) MCG/ACT aerosol powder inhalation INHALE 1 PUFF BY MOUTH EVERY 4 HOURS AS NEEDED 20   Automatic Reconciliation, Ar   EPINEPHrine (EPIPEN) 0.3 MG/0.3ML SOAJ injection epinephrine 0.3 mg/0.3 mL injection, auto-injector  Patient not taking: Reported on 2025    Automatic Reconciliation, Ar       Current medications:    Current Facility-Administered Medications   Medication Dose Route Frequency Provider Last Rate Last Admin   • 0.9 % sodium chloride infusion   IntraVENous Continuous Taurus Chan  mL/hr at 25 1206 NoRateChange at 25 1206       Allergies:    Allergies   Allergen Reactions   • Macadamia Nut Oil Swelling     peanuts   • Naproxen Swelling   • Nsaids Other (See Comments)   • Penicillins Hives and Swelling       Problem List:    Patient Active Problem List   Diagnosis Code   • Generalized abdominal pain R10.84   • Fibroids D21.9   •

## 2025-05-01 NOTE — PERIOP NOTE
Patient and caregiver educated regarding preop process. All patients are asked to arrive 2-3 hours before their schedule procedure. Notified patient and caregiver regarding scheduled procedure time and approximate length of procedure according to original schedule. If there are any changes to schedule, healthcare team will attempt to keep patient and caregiver as updated as possible.

## 2025-05-01 NOTE — PERIOP NOTE
Pt tolerates PO liquids.  Family educated on discharge instructions. Opportunity for questions given, no questions at this time.

## 2025-05-01 NOTE — PERIOP NOTE
TRANSFER - IN REPORT:    Verbal report received from OR RN on Maci Saucedo  being received from OR  for routine post-op      Report consisted of patient's Situation, Background, Assessment and   Recommendations(SBAR).     Information from the following report(s) Adult Overview, Surgery Report, Intake/Output, and MAR was reviewed with the receiving nurse.    Opportunity for questions and clarification was provided.      Assessment completed upon patient's arrival to unit and care assumed.

## 2025-05-01 NOTE — ANESTHESIA POSTPROCEDURE EVALUATION
.Post-Anesthesia Evaluation & Assessment    Visit Vitals  /66   Pulse 86   Temp 98 °F (36.7 °C) (Skin)   Resp 14   Ht 1.676 m (5' 6\")   Wt 86.3 kg (190 lb 4.8 oz)   SpO2 97%   BMI 30.72 kg/m²       Nausea/Vomiting: no nausea    Post-operative hydration adequate.    Pain score (VAS): 0    Mental status & Level of consciousness: alert and oriented x 3    Neurological status: moves all extremities, sensation grossly intact    Pulmonary status: airway patent, no supplemental oxygen required    Complications related to anesthesia: none    Additional comments:

## 2025-05-01 NOTE — OP NOTE
Operative Note      Patient: Maci Saucedo  YOB: 1975  MRN: [id number]    Date of Procedure: 5/1/2025    Pre-Op Diagnosis Codes:      * Excessive or frequent menstruation [N92.0]     * Subserous leiomyoma of uterus [D25.2]     * Intramural leiomyoma of uterus [D25.1]     * Pelvic pain syndrome [R10.2]     * Metrorrhagia [N92.1]    Post-Op Diagnosis: Same       Procedure(s):  ROBOTIC ASSISTED LAPAROSCOPIC SUPRACERVICAL HYSTERECTOMY, BILATERAL SALPINGECTOMY    Surgeon(s):  Taurus Chan MD    Assistant:   Surgical Assistant: Guerline Nino    Anesthesia: General    Estimated Blood Loss (mL): less than 100     Complications: None    Specimens:   ID Type Source Tests Collected by Time Destination   A : UTERUS, BILATERAL FALLOPIAN TUBES Tissue Uterus SURGICAL PATHOLOGY Taurus Chan MD 5/1/2025 1548        Implants:  * No implants in log *      Drains:   [REMOVED] Urinary Catheter 05/01/25 Chavez (Removed)       Findings: 450 gram fibroid uterus, pelvic peritoneal adhesions posterior       CASE LENGTH: 1 Hr 58 Min 32 Sec    Detailed Description of Procedure:     The patient was taken to the operating room where she was placed in the supine position. After being placed under general anesthesia, she was placed up in the [place] laparoscopy stirrups in the dorsal lithotomy position.The patient was then prepped and draped in the usual fashion. Time out was completed. The Chavez catheter was placed into the bladder. Attention was first turned to the vagina where the speculum was placed. The anterior lip of the cervix was grasped with a single-toothed tenaculum. The uterine sound measured the uterine cavity at 9 cm. The Rhiannon uterine manipulator was affixed to the cervix and the other instruments were removed from the vagina. Attention was turned to the abdomen, where Marcaine was injected 3 cm above the symphysis pubis. A transverse incision was made 5 cm in length and carried down to the fascia.

## 2025-05-01 NOTE — DISCHARGE INSTRUCTIONS
DISCHARGE SUMMARY from Nurse    PATIENT INSTRUCTIONS:    After general anesthesia or intravenous sedation, for 24 hours or while taking prescription Narcotics:  Limit your activities  Do not drive and operate hazardous machinery  Do not make important personal or business decisions  Do  not drink alcoholic beverages  If you have not urinated within 8 hours after discharge, please contact your surgeon on call.    Report the following to your surgeon:  Excessive pain, swelling, redness or odor of or around the surgical area  Temperature over 100.5  Nausea and vomiting lasting longer than 4 hours or if unable to take medications  Any signs of decreased circulation or nerve impairment to extremity: change in color, persistent  numbness, tingling, coldness or increase pain  Any questions    What to do at Home:    If you experience any of the following symptoms, please follow up with Dr. Chan.    *  Please give a list of your current medications to your Primary Care Provider.    *  Please update this list whenever your medications are discontinued, doses are      changed, or new medications (including over-the-counter products) are added.    *  Please carry medication information at all times in case of emergency situations.    These are general instructions for a healthy lifestyle:    No smoking/ No tobacco products/ Avoid exposure to second hand smoke  Surgeon General's Warning:  Quitting smoking now greatly reduces serious risk to your health.    Obesity, smoking, and sedentary lifestyle greatly increases your risk for illness    A healthy diet, regular physical exercise & weight monitoring are important for maintaining a healthy lifestyle    You may be retaining fluid if you have a history of heart failure or if you experience any of the following symptoms:  Weight gain of 3 pounds or more overnight or 5 pounds in a week, increased swelling in our hands or feet or shortness of breath while lying flat in bed.   medicines such as aspirin or anticoagulants (blood thinners).  To help stop the bleeding, your doctor may have put pressure on the incision or sewn up or cauterized (sealed) the incision. Or you may have had surgery to stop bleeding inside the surgery area. Your doctor also may have given you medicines that help stop the bleeding.  How can you care for yourself at home?  If you have strips of tape on the incision, leave the tape on until it falls off. Or follow your doctor's instructions for removing the tape. Keep the area dry at all times.  You will have a dressing over the incision. A dressing helps the incision heal and protects it. Your doctor will tell you how to take care of this.  If you do not have tape on the incision, wash the area daily with warm, soapy water, and pat it dry. Don't use hydrogen peroxide or alcohol, which can slow healing.    When should you call for help?    Call your doctor now or seek immediate medical care if:    You are dizzy or lightheaded, or you feel like you may faint.     You have bleeding that starts again or gets worse, such as soaking one or more bandages over 2 to 4 hours, even after holding pressure on the area.         __________________________________________________________________________________________________________________________________

## 2025-05-01 NOTE — PERIOP NOTE
Reviewed PTA medication list with patient/caregiver and patient/caregiver denies any additional medications.     Patient admits to having a responsible adult care for them at home for at least 24 hours after surgery.    Patient encouraged to use gown warming system and informed that using said warming gown to regulate body temperature prior to a procedure has been shown to help reduce the risks of blood clots and infection.    Patient's pharmacy of choice verified and documented in PTA medication section.    Dual skin assessment & fall risk band verification completed with IRINA Hines RN.

## 2025-05-01 NOTE — PERIOP NOTE
Notified A Salvatore RN that pt allergic to pncn, ancef order needs verified with Dr. Chan prior to procedure. She states she will notify Dr. Chan prior to procedure.

## 2025-05-01 NOTE — PERIOP NOTE
Patient assisted to the restroom and back to stretcher without incident.  Call bell within reach and no further needs at this time.    Family at bedside took patient's belongings and left holding.

## (undated) DEVICE — TIP COVER ACCESSORY

## (undated) DEVICE — GLOVE SURG SZ 9 THK91MIL LTX FREE SYN POLYISOPRENE ANTI

## (undated) DEVICE — LAPAROSCOPIC TROCAR SLEEVE/SINGLE USE: Brand: KII® OPTICAL ACCESS SYSTEM

## (undated) DEVICE — ELECTRODE PT RET AD L9FT HI MOIST COND ADH HYDRGEL CORDED

## (undated) DEVICE — GLOVE ORANGE PI 8 1/2   MSG9085

## (undated) DEVICE — SET TBNG DISP TIP FOR AHTO

## (undated) DEVICE — SET,IRRIGATION,CYSTO/TUR,90": Brand: MEDLINE

## (undated) DEVICE — SOLUTION IV LACTATED RINGERS INJECTION USP 1000ML

## (undated) DEVICE — ROBOTIC PACK: Brand: MEDLINE INDUSTRIES, INC.

## (undated) DEVICE — Z CONVERTED USE 2916815 PAD PT POS 36 IN SURGYPAD DISP

## (undated) DEVICE — TUBE SET WITH SMOKE EVACUATION

## (undated) DEVICE — SUTURE VICRYL + SZ 3-0 L27IN ABSRB UD L26MM SH 1/2 CIR VCP416H

## (undated) DEVICE — SUTURE VICRYL + SZ 1 L36IN ABSRB UD L36MM CT-1 1/2 CIR VCP947H

## (undated) DEVICE — SOLUTION IRRIG 500ML 0.9% SOD CHLO USP POUR PLAS BTL

## (undated) DEVICE — PENCIL ES L3M ROCK SWCH S STL HEX LOK BLDE ELECTRD HOLSTER

## (undated) DEVICE — ARM DRAPE

## (undated) DEVICE — COLUMN DRAPE

## (undated) DEVICE — GARMENT,MEDLINE,DVT,INT,CALF,MED, GEN2: Brand: MEDLINE

## (undated) DEVICE — SOLUTION IRRIGATION H2O 0797305] ICU MEDICAL INC] 1000ML

## (undated) DEVICE — TIP IU L8CM DIA6.7MM BLU SIL FLX DISP RUMI II

## (undated) DEVICE — LIQUIBAND RAPID ADHESIVE 36/CS 0.8ML: Brand: MEDLINE

## (undated) DEVICE — SEAL

## (undated) DEVICE — ACCESS PLATFORM FOR MINIMALLY INVASIVE SURGERY: Brand: GELPOINT®  MINI ADVANCED ACCESS PLATFORM

## (undated) DEVICE — BLADELESS OBTURATOR: Brand: WECK VISTA

## (undated) DEVICE — SYRINGE MED 10ML LUERLOCK TIP W/O SFTY DISP